# Patient Record
Sex: FEMALE | Race: WHITE | HISPANIC OR LATINO | Employment: FULL TIME | URBAN - METROPOLITAN AREA
[De-identification: names, ages, dates, MRNs, and addresses within clinical notes are randomized per-mention and may not be internally consistent; named-entity substitution may affect disease eponyms.]

---

## 2019-12-08 ENCOUNTER — APPOINTMENT (OUTPATIENT)
Dept: RADIOLOGY | Facility: MEDICAL CENTER | Age: 29
End: 2019-12-08
Attending: EMERGENCY MEDICINE
Payer: MEDICAID

## 2019-12-08 ENCOUNTER — HOSPITAL ENCOUNTER (EMERGENCY)
Facility: MEDICAL CENTER | Age: 29
End: 2019-12-08
Attending: EMERGENCY MEDICINE
Payer: MEDICAID

## 2019-12-08 VITALS
BODY MASS INDEX: 44.37 KG/M2 | OXYGEN SATURATION: 99 % | TEMPERATURE: 98.6 F | RESPIRATION RATE: 18 BRPM | HEART RATE: 92 BPM | DIASTOLIC BLOOD PRESSURE: 73 MMHG | WEIGHT: 205.03 LBS | SYSTOLIC BLOOD PRESSURE: 144 MMHG

## 2019-12-08 DIAGNOSIS — O20.0 ABORTION, THREATENED: ICD-10-CM

## 2019-12-08 LAB
B-HCG SERPL-ACNC: 8789 MIU/ML (ref 0–10)
BASOPHILS # BLD AUTO: 0.4 % (ref 0–1.8)
BASOPHILS # BLD: 0.06 K/UL (ref 0–0.12)
EOSINOPHIL # BLD AUTO: 0.15 K/UL (ref 0–0.51)
EOSINOPHIL NFR BLD: 1 % (ref 0–6.9)
ERYTHROCYTE [DISTWIDTH] IN BLOOD BY AUTOMATED COUNT: 43 FL (ref 35.9–50)
HCT VFR BLD AUTO: 43.6 % (ref 37–47)
HGB BLD-MCNC: 14.6 G/DL (ref 12–16)
IMM GRANULOCYTES # BLD AUTO: 0.11 K/UL (ref 0–0.11)
IMM GRANULOCYTES NFR BLD AUTO: 0.8 % (ref 0–0.9)
LYMPHOCYTES # BLD AUTO: 2.78 K/UL (ref 1–4.8)
LYMPHOCYTES NFR BLD: 19.3 % (ref 22–41)
MCH RBC QN AUTO: 30.4 PG (ref 27–33)
MCHC RBC AUTO-ENTMCNC: 33.5 G/DL (ref 33.6–35)
MCV RBC AUTO: 90.6 FL (ref 81.4–97.8)
MONOCYTES # BLD AUTO: 0.94 K/UL (ref 0–0.85)
MONOCYTES NFR BLD AUTO: 6.5 % (ref 0–13.4)
NEUTROPHILS # BLD AUTO: 10.38 K/UL (ref 2–7.15)
NEUTROPHILS NFR BLD: 72 % (ref 44–72)
NRBC # BLD AUTO: 0 K/UL
NRBC BLD-RTO: 0 /100 WBC
NUMBER OF RH DOSES IND 8505RD: NORMAL
PLATELET # BLD AUTO: 362 K/UL (ref 164–446)
PMV BLD AUTO: 9.4 FL (ref 9–12.9)
RBC # BLD AUTO: 4.81 M/UL (ref 4.2–5.4)
RH BLD: NORMAL
WBC # BLD AUTO: 14.4 K/UL (ref 4.8–10.8)

## 2019-12-08 PROCEDURE — 86901 BLOOD TYPING SEROLOGIC RH(D): CPT

## 2019-12-08 PROCEDURE — 84702 CHORIONIC GONADOTROPIN TEST: CPT

## 2019-12-08 PROCEDURE — 99284 EMERGENCY DEPT VISIT MOD MDM: CPT

## 2019-12-08 PROCEDURE — 76801 OB US < 14 WKS SINGLE FETUS: CPT

## 2019-12-08 PROCEDURE — 85025 COMPLETE CBC W/AUTO DIFF WBC: CPT

## 2019-12-09 NOTE — ED NOTES
UA collected and sent to the lab at this time. Pt changed into a gown and provided with warm blankets for comfort

## 2019-12-09 NOTE — DISCHARGE INSTRUCTIONS
"Pelvic rest  You have been given a diagnosis based on your short stay in the ED.  This may be a presumptive diagnosis and could be only the \"tip of the iceberg\" of a much more complicated problem.  Make sure that you follow up as directed or with a local primary care physician for a second evaluation.  If your condition worsens before follow up, return to the ED for evaluation.  Things like a new fever, worsening pain, change in symptoms, or just not getting better may be a sign of further problems.  If you have abdominal pain, make sure you return to the ED or follow up within 12-24 hours for a repeat evaluation if your pain continues.  This gives us a chance to take a second look at a process that may be changing and will now give us abnormal test results leading to a more definitive diagnosis.  Sometimes this will change your disposition and now require hospitalization or surgery.  Lastly, if you left the ED \"Against Medical Advice\", please know that you can return to the ED at any time for completion of your work up or admission to the hospital.     "

## 2019-12-09 NOTE — ED PROVIDER NOTES
ED Provider Note    ED Provider Note    Primary care provider: Pcp Pt States None    CHIEF COMPLAINT  Chief Complaint   Patient presents with   • Abdominal Pain   • Pregnancy     home preg test        HPI  Ann Salguero is a 29 y.o. female who presents with pelvic cramping and vag bleeding. Cramping started yesterday and bleeding earlier today.  Some clots.  Admits to nausea and dizziness.  Pt has irregular menses and states her last one was 2 months ago.  Positive home preg test two weeks ago.   with twins so this is her 4th pregnancy. No other complaints.    REVIEW OF SYSTEMS  ROS  All other systems negative. See HPI for further details.       ALLERGIES  Allergies   Allergen Reactions   • Pcn [Penicillins] Rash and Itching       CURRENT MEDICATIONS  Home Medications    **Home medications have not yet been reviewed for this encounter**         PAST MEDICAL HISTORY   has a past medical history of Allergic rhinitis and ASTHMA.    SURGICAL HISTORY   has a past surgical history that includes primary c section (2010).    SOCIAL HISTORY  Social History     Tobacco Use   • Smoking status: Never Smoker   • Smokeless tobacco: Never Used   Substance Use Topics   • Alcohol use: No   • Drug use: No      Social History     Substance and Sexual Activity   Drug Use No       FAMILY HISTORY  Family History   Problem Relation Age of Onset   • Lung Disease Mother         asthma   • Diabetes Mother    • Hypertension Mother    • Stroke Mother    • Heart Disease Mother         MI   • Hypertension Father    • Diabetes Paternal Grandfather          PHYSICAL EXAM  VITAL SIGNS: BP (!) 170/75   Pulse 92   Temp 37 °C (98.6 °F)   Resp 18   Wt 93 kg (205 lb 0.4 oz)   SpO2 99%   BMI 44.37 kg/m²   Constitutional: Well-nourished, Well developed. In mild distress.   Head: Normocephalic, Atraumatic  Eyes: PERRL, sclera anicteric, EOMI, no lid swelling  ENT: No nasal discharge or epistaxis. No facial deformity. Mucous membranes are  moist.   Cardiovascular: Regular rate and rhythm without S3,S4, or murmur.   Lungs: No respiratory distress. No cough. Lungs are clear bilaterally. No rales, rhonchi, or wheezing.   Abdomen: Soft, non-tender, non-distended. Without organomegaly. No rebound, rigidity, or guarding. No masses or abnormal pulsations.   Extremities: No deformity. Non-tender. No swelling.  Neurologic: Awake and alert. Normal speech. No sensory deficits. No motor deficits. Ambulatory with a steady gait.       DIAGNOSTIC STUDIES / PROCEDURES    Results for orders placed or performed during the hospital encounter of 12/08/19   CBC WITH DIFFERENTIAL   Result Value Ref Range    WBC 14.4 (H) 4.8 - 10.8 K/uL    RBC 4.81 4.20 - 5.40 M/uL    Hemoglobin 14.6 12.0 - 16.0 g/dL    Hematocrit 43.6 37.0 - 47.0 %    MCV 90.6 81.4 - 97.8 fL    MCH 30.4 27.0 - 33.0 pg    MCHC 33.5 (L) 33.6 - 35.0 g/dL    RDW 43.0 35.9 - 50.0 fL    Platelet Count 362 164 - 446 K/uL    MPV 9.4 9.0 - 12.9 fL    Neutrophils-Polys 72.00 44.00 - 72.00 %    Lymphocytes 19.30 (L) 22.00 - 41.00 %    Monocytes 6.50 0.00 - 13.40 %    Eosinophils 1.00 0.00 - 6.90 %    Basophils 0.40 0.00 - 1.80 %    Immature Granulocytes 0.80 0.00 - 0.90 %    Nucleated RBC 0.00 /100 WBC    Neutrophils (Absolute) 10.38 (H) 2.00 - 7.15 K/uL    Lymphs (Absolute) 2.78 1.00 - 4.80 K/uL    Monos (Absolute) 0.94 (H) 0.00 - 0.85 K/uL    Eos (Absolute) 0.15 0.00 - 0.51 K/uL    Baso (Absolute) 0.06 0.00 - 0.12 K/uL    Immature Granulocytes (abs) 0.11 0.00 - 0.11 K/uL    NRBC (Absolute) 0.00 K/uL   HCG QUANTITATIVE SERUM   Result Value Ref Range    Bhcg 8789.0 (H) 0.0 - 10.0 mIU/mL   RH TYPE FOR RHOGAM FROM E.D.   Result Value Ref Range    Emergency Department Rh Typing POS     Number Of Rh Doses Indicated ZERO        All labs reviewed by me.  Dr. Kyle reviewed and agreed with the EKG.     RADIOLOGY  US-OB 1ST TRIMESTER WITH TRANSVAGINAL (COMBO)    (Results Pending)     Films read by Radiologist, and  independently reviewed by myself.    COURSE & MEDICAL DECISION MAKING:  Nursing notes, VS, PMSFHx reviewed in chart.     Patient made stable throughout her emergency department course.  She has a quant of around 8000 and a ultrasound read by the tech as seeing an intrauterine placenta but no fetus at this time.  There is no free fluid or ectopic pregnancy noted.  The patient is stable to go home.  She is Rh+.  She is given a prescription for a repeat beta quant for Wednesday morning.  She will also be referred to the [facility] pregnancy center for follow-up.  She can expect the possibility of increased cramping and bleeding and she can return if worse or new symptoms arise.  She understood and agreed to the above plan.    FINAL IMPRESSION:  1. , threatened         DISPOSITION:  Home    Please note that this dictation was created using voice recognition software. I have worked with consultants from the vendor as well as technical experts from [facility] to optimize the interface. I have made every reasonable attempt to correct obvious errors, but I expect that there are errors of grammar and possibly content that I did not discover before finalizing the note.

## 2019-12-17 ENCOUNTER — GYNECOLOGY VISIT (OUTPATIENT)
Dept: OBGYN | Facility: CLINIC | Age: 29
End: 2019-12-17
Payer: MEDICAID

## 2019-12-17 DIAGNOSIS — O20.0 THREATENED ABORTION: ICD-10-CM

## 2019-12-17 DIAGNOSIS — A59.9 TRICHOMONAS INFECTION: ICD-10-CM

## 2019-12-17 PROCEDURE — 99203 OFFICE O/P NEW LOW 30 MIN: CPT | Mod: 25 | Performed by: OBSTETRICS & GYNECOLOGY

## 2019-12-17 PROCEDURE — 76830 TRANSVAGINAL US NON-OB: CPT | Performed by: OBSTETRICS & GYNECOLOGY

## 2019-12-17 NOTE — NON-PROVIDER
Pt here for Er f/u  C/o bleeding on 12/08/2019, but none since then.  Pt states that she has been sexually active with FOB who was not treated for trichomonas.  Requesting records from [facility] from [facility] pap smear.  Phone # [phone]  Pharmacy verified.

## 2019-12-18 NOTE — PROGRESS NOTES
Subjective:      Ann Salguero is a 29 y.o. female who presents as New Patient (ER f/u)            HPI patient is a 29-year-old  4 para 3-0-0-4 who presents today for follow-up from the emergency room for vaginal bleeding and positive pregnancy test.  She was seen in the ER for cramping and spotting on 2019 at which time she had an ultrasound showing an intrauterine gestational sac with yolk sac and no fetal pole.  She had beta-hCG levels drawn and was told to repeat labs in 48 hours and patient states she went to [facility] and had labs drawn but results are not available.  She reports she has not had any bleeding or spotting since her ER visit.  Patient states she was diagnosed with trichomonas from [facility] recently and she took medication but had unprotected intercourse right afterwards with FOB and that FOB had refused to get treated at that time.  I discussed with patient that she and her the FOB needs to go to the health department to be treated at the same time and patient agrees.  I discussed risks of untreated trichomonas infection in pregnancy.  Patient reports normal bowel and bladder functions.  Denies any pelvic pain.  She denies any nausea vomiting or dysuria.  Pregnancy is desired.  Patient is not using any contraception.  She is taking prenatal vitamins    ROS all organ systems were reviewed and were negative except for complaints in HPI       Objective:     There were no vitals taken for this visit.     Physical Exam  Vitals signs and nursing note reviewed. Exam conducted with a chaperone present.   Constitutional:       General: She is not in acute distress.     Appearance: Normal appearance. She is obese. She is not toxic-appearing.   Neck:      Musculoskeletal: Neck supple. No neck rigidity.   Cardiovascular:      Rate and Rhythm: Normal rate and regular rhythm.      Pulses: Normal pulses.      Heart sounds: Normal heart sounds.   Pulmonary:      Effort: Pulmonary effort is  normal. No respiratory distress.      Breath sounds: Normal breath sounds. No wheezing.   Abdominal:      General: Abdomen is flat. Bowel sounds are normal. There is no distension.      Palpations: Abdomen is soft.      Tenderness: There is no tenderness.   Genitourinary:     General: Normal vulva.      Labia:         Right: No rash, tenderness, lesion or injury.         Left: No rash, tenderness, lesion or injury.       Vagina: Vaginal discharge present. No bleeding.      Cervix: No friability, erythema, cervical bleeding or eversion.      Uterus: Enlarged. Not tender.       Adnexa:         Right: No mass, tenderness or fullness.          Left: No mass, tenderness or fullness.        Rectum: No external hemorrhoid.   Musculoskeletal: Normal range of motion.         General: No swelling or tenderness.   Lymphadenopathy:      Cervical: No cervical adenopathy.   Skin:     General: Skin is warm and dry.   Neurological:      General: No focal deficit present.      Mental Status: She is alert and oriented to person, place, and time.      Gait: Gait normal.   Psychiatric:         Mood and Affect: Mood normal.         Behavior: Behavior normal.         Thought Content: Thought content normal.         Judgment: Judgment normal.            Transvaginal ultrasound was performed and read by me:    Jones intrauterine pregnancy noted  Fetal heart rate was 128 bpm  Crown-rump length was 0.57 cm corresponding to 6 weeks and 2 days gestation  EDC by CRL is 2020  No adnexal masses noted  No pelvic free fluid noted    Impression: Normal intrauterine pregnancy at 6 weeks and 2 days gestation with EDC of 2020.     Assessment/Plan:       1. Threatened   Patient presents today for follow-up of threatened  from the ER.  Normal intrauterine pregnancy confirmed today at 6 weeks and 3 days gestation.  Findings discussed with patient  Pregnancy precautions and restrictions discussed  Patient to continue prenatal  vitamins  Patient to increase p.o. fluids    2. Trichomonas infection  I discussed trichomonas infection and risks.  I encourage patient to go with FOB to health department to be treated at the same time and patient agrees    3.  Precautions and plan of care were discussed.  Patient to follow-up in 3 to 4 weeks for new OB exam

## 2020-01-17 ENCOUNTER — INITIAL PRENATAL (OUTPATIENT)
Dept: OBGYN | Facility: CLINIC | Age: 30
End: 2020-01-17
Payer: MEDICAID

## 2020-01-17 VITALS — WEIGHT: 200 LBS | DIASTOLIC BLOOD PRESSURE: 74 MMHG | SYSTOLIC BLOOD PRESSURE: 124 MMHG | BODY MASS INDEX: 43.28 KG/M2

## 2020-01-17 DIAGNOSIS — O09.91 ENCOUNTER FOR SUPERVISION OF HIGH RISK PREGNANCY IN FIRST TRIMESTER, ANTEPARTUM: Primary | ICD-10-CM

## 2020-01-17 DIAGNOSIS — Z98.891 HISTORY OF CESAREAN SECTION: ICD-10-CM

## 2020-01-17 DIAGNOSIS — Z34.90 PREGNANCY, UNSPECIFIED GESTATIONAL AGE: ICD-10-CM

## 2020-01-17 LAB
APPEARANCE UR: NORMAL
BILIRUB UR STRIP-MCNC: NORMAL MG/DL
COLOR UR AUTO: NORMAL
GLUCOSE UR STRIP.AUTO-MCNC: NEGATIVE MG/DL
KETONES UR STRIP.AUTO-MCNC: 80 MG/DL
LEUKOCYTE ESTERASE UR QL STRIP.AUTO: NEGATIVE
NITRITE UR QL STRIP.AUTO: NEGATIVE
PH UR STRIP.AUTO: 5.5 [PH] (ref 5–8)
PROT UR QL STRIP: 100 MG/DL
RBC UR QL AUTO: NEGATIVE
SP GR UR STRIP.AUTO: >=1.03
UROBILINOGEN UR STRIP-MCNC: NORMAL MG/DL

## 2020-01-17 PROCEDURE — 81002 URINALYSIS NONAUTO W/O SCOPE: CPT | Performed by: NURSE PRACTITIONER

## 2020-01-17 PROCEDURE — 0500F INITIAL PRENATAL CARE VISIT: CPT | Performed by: NURSE PRACTITIONER

## 2020-01-17 RX ORDER — METRONIDAZOLE 500 MG/1
2000 TABLET ORAL ONCE
Qty: 4 TAB | Refills: 0 | Status: SHIPPED | OUTPATIENT
Start: 2020-01-17 | End: 2020-01-17

## 2020-01-17 ASSESSMENT — ENCOUNTER SYMPTOMS
NEUROLOGICAL NEGATIVE: 1
RESPIRATORY NEGATIVE: 1
PSYCHIATRIC NEGATIVE: 1
GASTROINTESTINAL NEGATIVE: 1
CARDIOVASCULAR NEGATIVE: 1
CONSTITUTIONAL NEGATIVE: 1
EYES NEGATIVE: 1
MUSCULOSKELETAL NEGATIVE: 1

## 2020-01-17 NOTE — LETTER
Cystic Fibrosis Carrier Testing  Ann Salguero    The following information is about a blood test that can be done to determine if you and/or your partner carry the gene for cystic fibrosis.    WHAT IS CYSTIC FIBROSIS?  · Cystic fibrosis (CF) is an inherited disease that affects more than 25,000 American children and young adults.  · Symptoms of CF vary but include lung congestion, pneumonia, diarrhea and poor growth.  Most people with CF have severe medical problems and some die at a young age.  Others have so few symptoms they are unaware they have CF.  · CF does not affect intelligence.  · Although there is no cure for CF at this time, scientists are making progress in improving treatment and in searching for a cure.  In the past many people with CF  at a very young age.  Today, many are living into their 20’s and 30’s.    IS THERE A CHANCE MY BABY COULD HAVE CYSTIC FIBROSIS?  · You can have a child with CF even if there is no history in your family (see chart below).  · CF testing can help determine if you are a carrier and at risk to have a child with CF.  Note: if both parents are carriers, there is a 1 in 4 (25%) chance with each pregnancy that they will have a child with CF.  · Carriers have one normal CF gene and one altered CF gene.  · People with CF have two altered CF genes.  · Most people have two normal copies of the CF gene.    Approximate risk that a couple with no family history of cystic fibrosis will have a child with cystic fibrosis:    Ethnic background / Risk     couple:  1 in 2,500   couple:  1 in 15,000            couple:  1 in 8,000     American couple:  1 in 32,000     WHAT TESTING IS AVAILABLE?  · There is a blood test that can be done to find out if you or your partner is a carrier.  · It is important to understand that CF carrier testing does not detect all CF carriers.  · If the test shows that you are both CF carriers, you unborn baby can be  tested to find out if the baby has CF.    HOW MUCH DOES IT COST TO HAVE CYSTIC FIBROSIS CARRIER TESTING?  · Cost and insurance coverage for CF carrier testing vary depending upon the laboratory used and your insurance policy.  · The average cost for CF carrier testing is $300 per person.  · Your genetic counselor can provide you with more information about cystic fibrosis carrier testing.    _____  Yes, I am interested in discussing carrier testing with a genetic counselor.    _____  No, I am not interested in CF carrier testing or in receiving more information about CF carrier testing.      Client signature: ________________________________________  1/17/2020

## 2020-01-17 NOTE — PROGRESS NOTES
Pt here for today for NOB visit   Pt states just minor nausea but no vomiting   Pt denies VB,LOF, and contractions   Good# [phone]  Pharmacy verified.   First prenatal care  Pt desires AFP  Planned pregnancy   FOB is involved and supportive   Pt works outside the home; maintenance work   Pt wears mask around cleaning products   Pt denies heavy lifting

## 2020-01-18 NOTE — PROGRESS NOTES
Subjective:      S:  Ann Salguero is a 29 y.o.  female  @ EGA: 11w4d MERA: Estimated Date of Delivery: 8/3/20  per US who presents for her new OB exam. She has a hx of vag delivery x 2 in  and  and a P C/S for twins in . She desires a TOLAC. She has no complaints. She was seen in ED on 19 for spotting and cramping with a f/u visit at [facility] on . She was dx w trich and advised to proceed to [facility] to seek tx for herself and partner. She has not done this yet. Desires AFP.  Declines CF.  Reports no FM, VB, LOF, or cramping.  Denies dysuria, vaginal DC.  Pt is  and lives with FORAOUL (Mathew). Works as , lifting precautions discussed.   Pregnancy is planned.  Not appropriate for Centering Pregnancy.    O:    Vitals:    20 1550   BP: 124/74   Weight: 90.7 kg (200 lb)    See H&P Prenatal Physical.  Wet mount: not done, known trich infection present        FHTs: 160        Fundal ht: 11cm     A:   1.  IUP @ 11w4d MERA: Estimated Date of Delivery: 8/3/20 per US         2.  S=D        3.    Patient Active Problem List    Diagnosis Date Noted   • Encounter for supervision of high risk pregnancy in first trimester, antepartum 2020   • History of  section for twins in -desires TOLAC 2020   • Threatened  2019   • Trichomonas infection 2019   • Increased BMI 2010   • ASTHMA          P:  1.  GC/CT done. Pap done.         2.  Prenatal labs ordered - lab slip given        3.  Discussed PNV, diet, and adequate water intake        4.  NOB packet given        5.  Return to office in 4 wks        6.  Complete OB US in 8-9 wks        7.  Rx for metronidazole        8.  Plan for f/u [facility] at next visit        9.  Plan for expedited partner treatment: Rx for metronidazole called into pharmacy for Mathew Rodriguez  1994    HPI    Review of Systems   Constitutional: Negative.    HENT: Negative.    Eyes: Negative.     Respiratory: Negative.    Cardiovascular: Negative.    Gastrointestinal: Negative.    Genitourinary: Negative.    Musculoskeletal: Negative.    Skin: Negative.    Neurological: Negative.    Endo/Heme/Allergies: Negative.    Psychiatric/Behavioral: Negative.           Objective:     /74   Wt 90.7 kg (200 lb)   BMI 43.28 kg/m²      Physical Exam  Vitals signs and nursing note reviewed.   Constitutional:       Appearance: She is well-developed.   Neck:      Musculoskeletal: Normal range of motion and neck supple.   Cardiovascular:      Rate and Rhythm: Normal rate and regular rhythm.      Heart sounds: Normal heart sounds.   Pulmonary:      Effort: Pulmonary effort is normal.      Breath sounds: Normal breath sounds.   Abdominal:      Palpations: Abdomen is soft.   Genitourinary:     Vagina: Normal.      Comments: Uterus enlarged, c/w 11 wk ga  Musculoskeletal: Normal range of motion.   Skin:     General: Skin is warm and dry.   Neurological:      Mental Status: She is alert and oriented to person, place, and time.      Deep Tendon Reflexes: Reflexes are normal and symmetric.   Psychiatric:         Behavior: Behavior normal.         Thought Content: Thought content normal.         Judgment: Judgment normal.                 Assessment/Plan:       1. Encounter for supervision of high risk pregnancy in first trimester, antepartum    - PRENATAL PANEL 3+HIV+HCV; Future  - URINE DRUG SCREEN W/CONF (AR); Future  - US-OB 2ND 3RD TRI COMPLETE; Future  - THINPREP PAP W/HPV AND CTNG; Future    2. Pregnancy, unspecified gestational age    - POCT Urinalysis    3. History of  section for twins in -desires TOLAC

## 2020-01-22 DIAGNOSIS — O09.91 ENCOUNTER FOR SUPERVISION OF HIGH RISK PREGNANCY IN FIRST TRIMESTER, ANTEPARTUM: ICD-10-CM

## 2020-01-22 DIAGNOSIS — O09.92 SUPERVISION OF HIGH RISK PREGNANCY IN SECOND TRIMESTER: ICD-10-CM

## 2020-02-11 ENCOUNTER — APPOINTMENT (OUTPATIENT)
Dept: RADIOLOGY | Facility: MEDICAL CENTER | Age: 30
End: 2020-02-11
Attending: EMERGENCY MEDICINE
Payer: MEDICAID

## 2020-02-11 ENCOUNTER — HOSPITAL ENCOUNTER (EMERGENCY)
Facility: MEDICAL CENTER | Age: 30
End: 2020-02-11
Attending: EMERGENCY MEDICINE
Payer: MEDICAID

## 2020-02-11 VITALS
RESPIRATION RATE: 16 BRPM | TEMPERATURE: 97.5 F | SYSTOLIC BLOOD PRESSURE: 120 MMHG | HEIGHT: 57 IN | OXYGEN SATURATION: 99 % | DIASTOLIC BLOOD PRESSURE: 84 MMHG | HEART RATE: 72 BPM | WEIGHT: 196.87 LBS | BODY MASS INDEX: 42.47 KG/M2

## 2020-02-11 DIAGNOSIS — J20.9 ACUTE BRONCHITIS, UNSPECIFIED ORGANISM: ICD-10-CM

## 2020-02-11 DIAGNOSIS — O20.0 THREATENED MISCARRIAGE: ICD-10-CM

## 2020-02-11 LAB
ALBUMIN SERPL BCP-MCNC: 3.9 G/DL (ref 3.2–4.9)
ALBUMIN/GLOB SERPL: 1.2 G/DL
ALP SERPL-CCNC: 52 U/L (ref 30–99)
ALT SERPL-CCNC: 36 U/L (ref 2–50)
ANION GAP SERPL CALC-SCNC: 10 MMOL/L (ref 0–11.9)
APPEARANCE UR: CLEAR
AST SERPL-CCNC: 24 U/L (ref 12–45)
B-HCG SERPL-ACNC: ABNORMAL MIU/ML (ref 0–5)
BASOPHILS # BLD AUTO: 0.4 % (ref 0–1.8)
BASOPHILS # BLD: 0.05 K/UL (ref 0–0.12)
BILIRUB SERPL-MCNC: 0.3 MG/DL (ref 0.1–1.5)
BILIRUB UR QL STRIP.AUTO: NEGATIVE
BUN SERPL-MCNC: 6 MG/DL (ref 8–22)
CALCIUM SERPL-MCNC: 9.5 MG/DL (ref 8.5–10.5)
CHLORIDE SERPL-SCNC: 102 MMOL/L (ref 96–112)
CO2 SERPL-SCNC: 22 MMOL/L (ref 20–33)
COLOR UR: YELLOW
CREAT SERPL-MCNC: 0.52 MG/DL (ref 0.5–1.4)
EOSINOPHIL # BLD AUTO: 0.1 K/UL (ref 0–0.51)
EOSINOPHIL NFR BLD: 0.9 % (ref 0–6.9)
ERYTHROCYTE [DISTWIDTH] IN BLOOD BY AUTOMATED COUNT: 42.5 FL (ref 35.9–50)
GLOBULIN SER CALC-MCNC: 3.3 G/DL (ref 1.9–3.5)
GLUCOSE SERPL-MCNC: 89 MG/DL (ref 65–99)
GLUCOSE UR STRIP.AUTO-MCNC: NEGATIVE MG/DL
HCT VFR BLD AUTO: 43.5 % (ref 37–47)
HGB BLD-MCNC: 14.5 G/DL (ref 12–16)
IMM GRANULOCYTES # BLD AUTO: 0.1 K/UL (ref 0–0.11)
IMM GRANULOCYTES NFR BLD AUTO: 0.9 % (ref 0–0.9)
KETONES UR STRIP.AUTO-MCNC: NEGATIVE MG/DL
LEUKOCYTE ESTERASE UR QL STRIP.AUTO: NEGATIVE
LYMPHOCYTES # BLD AUTO: 1.65 K/UL (ref 1–4.8)
LYMPHOCYTES NFR BLD: 14.3 % (ref 22–41)
MCH RBC QN AUTO: 30.1 PG (ref 27–33)
MCHC RBC AUTO-ENTMCNC: 33.3 G/DL (ref 33.6–35)
MCV RBC AUTO: 90.2 FL (ref 81.4–97.8)
MICRO URNS: NORMAL
MONOCYTES # BLD AUTO: 0.33 K/UL (ref 0–0.85)
MONOCYTES NFR BLD AUTO: 2.9 % (ref 0–13.4)
NEUTROPHILS # BLD AUTO: 9.32 K/UL (ref 2–7.15)
NEUTROPHILS NFR BLD: 80.6 % (ref 44–72)
NITRITE UR QL STRIP.AUTO: NEGATIVE
NRBC # BLD AUTO: 0 K/UL
NRBC BLD-RTO: 0 /100 WBC
NUMBER OF RH DOSES IND 8505RD: NORMAL
PH UR STRIP.AUTO: 6.5 [PH] (ref 5–8)
PLATELET # BLD AUTO: 354 K/UL (ref 164–446)
PMV BLD AUTO: 9.4 FL (ref 9–12.9)
POTASSIUM SERPL-SCNC: 3.6 MMOL/L (ref 3.6–5.5)
PROT SERPL-MCNC: 7.2 G/DL (ref 6–8.2)
PROT UR QL STRIP: NEGATIVE MG/DL
RBC # BLD AUTO: 4.82 M/UL (ref 4.2–5.4)
RBC UR QL AUTO: NEGATIVE
RH BLD: NORMAL
SODIUM SERPL-SCNC: 134 MMOL/L (ref 135–145)
SP GR UR STRIP.AUTO: 1
UROBILINOGEN UR STRIP.AUTO-MCNC: 0.2 MG/DL
WBC # BLD AUTO: 11.6 K/UL (ref 4.8–10.8)

## 2020-02-11 PROCEDURE — 84702 CHORIONIC GONADOTROPIN TEST: CPT

## 2020-02-11 PROCEDURE — 99284 EMERGENCY DEPT VISIT MOD MDM: CPT

## 2020-02-11 PROCEDURE — 81003 URINALYSIS AUTO W/O SCOPE: CPT

## 2020-02-11 PROCEDURE — 76815 OB US LIMITED FETUS(S): CPT

## 2020-02-11 PROCEDURE — 86901 BLOOD TYPING SEROLOGIC RH(D): CPT

## 2020-02-11 PROCEDURE — 85025 COMPLETE CBC W/AUTO DIFF WBC: CPT

## 2020-02-11 PROCEDURE — 80053 COMPREHEN METABOLIC PANEL: CPT

## 2020-02-11 NOTE — ED NOTES
Discharged to home. Verbalized understanding of all discharge instructions, education, and follow-up care. Denied concerns, needs upon discharge. Ambulated out of facility independently without difficulty.

## 2020-02-11 NOTE — ED TRIAGE NOTES
"Pt to triage, states \" she is about 16 weeks pregnant\" , c/o light\" spotting yesterday and today, with some cramping\"  Has an appointment on Friday at the pregnancy center, also congestion and cough. Pt provided urine cup in triage   "

## 2020-02-11 NOTE — ED PROVIDER NOTES
ED Provider Note    Scribed for Angel Figueredo M.D. by Brandon Encarnacion. 2/11/2020  2:14 PM    Primary care provider: Pcp Pt States None  Means of arrival: walk-in  History obtained from: Patient  History limited by: None    CHIEF COMPLAINT  Chief Complaint   Patient presents with   • Vaginal Bleeding   • Cramping   • Nasal Congestion       HPI  Ann Salguero is a 29 y.o. female who presents to the Emergency Department complaining of light spotting, mild left lower abdominal cramping, vomiting, fever, cough, and nausea that began yesterday and and has persisted since onset. She believes the spotting and abdominal cramping began active sexual intercourse yesterday. She denies diarrhea. [facility].    REVIEW OF SYSTEMS  Pertinent positives include v. Pertinent negatives include no diarrhea.  All other systems reviewed and negative.    PAST MEDICAL HISTORY   has a past medical history of Allergic rhinitis and ASTHMA.    SURGICAL HISTORY   has a past surgical history that includes primary c section (12/29/2010).    SOCIAL HISTORY  Social History     Tobacco Use   • Smoking status: Never Smoker   • Smokeless tobacco: Never Used   Substance Use Topics   • Alcohol use: No   • Drug use: No      Social History     Substance and Sexual Activity   Drug Use No       FAMILY HISTORY  Family History   Problem Relation Age of Onset   • Lung Disease Mother         asthma   • Diabetes Mother    • Hypertension Mother    • Stroke Mother    • Heart Disease Mother         MI   • Hypertension Father    • Diabetes Maternal Grandmother    • Hypertension Maternal Grandmother    • Hyperlipidemia Maternal Grandmother    • Diabetes Maternal Grandfather    • Hypertension Maternal Grandfather    • Hyperlipidemia Maternal Grandfather    • Diabetes Paternal Grandfather    • No Known Problems Sister        CURRENT MEDICATIONS  No current facility-administered medications on file prior to  "encounter.      Current Outpatient Medications on File Prior to Encounter   Medication Sig Dispense Refill   • albuterol (VENTOLIN OR PROVENTIL) 108 (90 BASE) MCG/ACT AERS Inhale 2 Puffs by mouth every 6 hours as needed for Shortness of Breath. 8.5 g 0   • albuterol (VENTOLIN OR PROVENTIL) 108 (90 BASE) MCG/ACT AERS Inhale 2 Puffs by mouth every 6 hours as needed.         ALLERGIES  Allergies   Allergen Reactions   • Pcn [Penicillins] Rash and Itching       PHYSICAL EXAM  VITAL SIGNS: /93   Pulse 86   Temp 36.4 °C (97.5 °F) (Temporal)   Resp 16   Ht 1.448 m (4' 9\")   Wt 89.3 kg (196 lb 13.9 oz)   SpO2 99%   BMI 42.60 kg/m²     Vital signs reviewed.  Constitutional:  No acute distress  Head: Normocephalic  Eyes: Pupils equal, round, and reactive  Mouth/Throat: Moist mucous membranes, clear oropharynx  Neck: Supple.  No JVD  Cardiovascular: Regular rate and rhythm  Pulmonary/Chest: Clear to auscultation bilaterally  Abdominal: Mild left lower quadrant abdominal pain without signs of peritonitis  Musculoskeletal: No CVA tenderness  Lymphadenopathy: No lymphadenopathy  Neurological: Normal strength and sensation.  Normal gait  Skin: Normal upper and lower extremities.  No rashes  Psychiatric: Awake alert oriented x3.  Normal judgment and insight                    LABS  Results for orders placed or performed during the hospital encounter of 02/11/20   CBC WITH DIFFERENTIAL   Result Value Ref Range    WBC 11.6 (H) 4.8 - 10.8 K/uL    RBC 4.82 4.20 - 5.40 M/uL    Hemoglobin 14.5 12.0 - 16.0 g/dL    Hematocrit 43.5 37.0 - 47.0 %    MCV 90.2 81.4 - 97.8 fL    MCH 30.1 27.0 - 33.0 pg    MCHC 33.3 (L) 33.6 - 35.0 g/dL    RDW 42.5 35.9 - 50.0 fL    Platelet Count 354 164 - 446 K/uL    MPV 9.4 9.0 - 12.9 fL    Neutrophils-Polys 80.60 (H) 44.00 - 72.00 %    Lymphocytes 14.30 (L) 22.00 - 41.00 %    Monocytes 2.90 0.00 - 13.40 %    Eosinophils 0.90 0.00 - 6.90 %    Basophils 0.40 0.00 - 1.80 %    Immature Granulocytes " 0.90 0.00 - 0.90 %    Nucleated RBC 0.00 /100 WBC    Neutrophils (Absolute) 9.32 (H) 2.00 - 7.15 K/uL    Lymphs (Absolute) 1.65 1.00 - 4.80 K/uL    Monos (Absolute) 0.33 0.00 - 0.85 K/uL    Eos (Absolute) 0.10 0.00 - 0.51 K/uL    Baso (Absolute) 0.05 0.00 - 0.12 K/uL    Immature Granulocytes (abs) 0.10 0.00 - 0.11 K/uL    NRBC (Absolute) 0.00 K/uL   COMP METABOLIC PANEL   Result Value Ref Range    Sodium 134 (L) 135 - 145 mmol/L    Potassium 3.6 3.6 - 5.5 mmol/L    Chloride 102 96 - 112 mmol/L    Co2 22 20 - 33 mmol/L    Anion Gap 10.0 0.0 - 11.9    Glucose 89 65 - 99 mg/dL    Bun 6 (L) 8 - 22 mg/dL    Creatinine 0.52 0.50 - 1.40 mg/dL    Calcium 9.5 8.5 - 10.5 mg/dL    AST(SGOT) 24 12 - 45 U/L    ALT(SGPT) 36 2 - 50 U/L    Alkaline Phosphatase 52 30 - 99 U/L    Total Bilirubin 0.3 0.1 - 1.5 mg/dL    Albumin 3.9 3.2 - 4.9 g/dL    Total Protein 7.2 6.0 - 8.2 g/dL    Globulin 3.3 1.9 - 3.5 g/dL    A-G Ratio 1.2 g/dL   RH TYPE FOR RHOGAM FROM E.D.   Result Value Ref Range    Emergency Department Rh Typing POS     Number Of Rh Doses Indicated ZERO    HCG QUANTITATIVE   Result Value Ref Range    Bhcg 68538.7 (H) 0.0 - 5.0 mIU/mL   URINALYSIS,CULTURE IF INDICATED   Result Value Ref Range    Color Yellow     Character Clear     Specific Gravity 1.004 <1.035    Ph 6.5 5.0 - 8.0    Glucose Negative Negative mg/dL    Ketones Negative Negative mg/dL    Protein Negative Negative mg/dL    Bilirubin Negative Negative    Urobilinogen, Urine 0.2 Negative    Nitrite Negative Negative    Leukocyte Esterase Negative Negative    Occult Blood Negative Negative    Micro Urine Req see below    ESTIMATED GFR   Result Value Ref Range    GFR If African American >60 >60 mL/min/1.73 m 2    GFR If Non African American >60 >60 mL/min/1.73 m 2         US-OB LIMITED WITH TRANSVAGINAL (COMBO)   Final Result      Single intrauterine pregnancy of an estimated gestational age of 15 weeks and 5 days  with an estimated date of delivery of 07/30/2020.       Trace amount of echogenic debris versus clot near the cervix.      Trace fluid in the cervical canal.      This examination does not constitute a complete fetal survey.          All labs reviewed by me.      RADIOLOGY  US-OB LIMITED WITH TRANSVAGINAL (COMBO)   Final Result      Single intrauterine pregnancy of an estimated gestational age of 15 weeks and 5 days  with an estimated date of delivery of 07/30/2020.      Trace amount of echogenic debris versus clot near the cervix.      Trace fluid in the cervical canal.      This examination does not constitute a complete fetal survey.        The radiologist's interpretation of all radiological studies have been reviewed by me.    COURSE & MEDICAL DECISION MAKING  Pertinent Labs & Imaging studies reviewed. (See chart for details) The patient's [facility] and past medical records were reviewed    2:14 PM - Patient seen and examined at bedside. Patient will be treated with Tylenol as needed. Ordered blood urine ultrasound to evaluate her symptoms.      She had no concerning results on lab work from today and her US demonstrated a single intrauterine pregnancy of an estimated gestational age of 15 weeks and 5 days. Her spotting and cramping are likely related to irritation of the cervix secondary to sexual intercourse. I recommended pelvic rest until symptoms persist. The patient presents today with signs and symptoms consistent with a viral upper respiratory infection.  They have a normal pulse oximetry on room air and a normal pulmonary exam.  Therefore, I feel that the likelihood of pneumonia is low. There are no signs of peritonitis. There is no tenderness to make me concerned for more serious intra-abdominal pathology. This patient does not demonstrate any clinical evidence of pneumonia, meningitis, appendicitis, or other acute medical emergency.  Overall, the patient is very well appearing. I do not feel that this patient would benefit from antibiotics at this  time.  I have recommended Tylenol and Ibuprofen as needed for fever. She was instructed [facility] and return to the ED if her symptoms worsen or for severe vaginal bleeding. Patient understands and agrees.      Patient be placed on pelvic rest in terms of her vaginal spotting after intercourse last night.  Patient was discharged home in stable condition        The patient will return for new or worsening symptoms and is stable at the time of discharge.    The patient is referred to a primary physician for blood pressure management, diabetic screening, and for all other preventative health concerns.    DISPOSITION:  Patient will be discharged home in stable condition.    FOLLOW UP:  No follow-up provider specified.    OUTPATIENT MEDICATIONS:  New Prescriptions    No medications on file       FINAL IMPRESSION  1. Threatened miscarriage    2. Acute bronchitis, unspecified organism          Brandon MILLER (Scribe), am scribing for, and in the presence of, Angel Figueredo M.D..    Electronically signed by: Brandon Encarnacion (Scribe), 2/11/2020    Angel MILLER M.D. personally performed the services described in this documentation, as scribed by Brandon Encarnacion in my presence, and it is both accurate and complete.    C    The note accurately reflects work and decisions made by me.  Angel Figueredo M.D.  2/11/2020  3:07 PM

## 2020-02-14 LAB
ABO GROUP BLD: NORMAL
BLD GP AB SCN SERPL QL: NEGATIVE
HCT VFR BLD AUTO: 43.7 %
HGB BLD-MCNC: 14.4 G/DL
HIV 1+2 AB+HIV1 P24 AG SERPL QL IA: NON REACTIVE
Lab: NEGATIVE
PLATELET # BLD AUTO: 357 10*3/UL
RH BLD: POSITIVE
RUBV IGG SERPL IA-ACNC: NORMAL
TREPONEMA PALLIDUM IGG+IGM AB [PRESENCE] IN SERUM OR PLASMA BY IMMUNOASSAY: NON REACTIVE

## 2020-02-19 ENCOUNTER — ROUTINE PRENATAL (OUTPATIENT)
Dept: OBGYN | Facility: CLINIC | Age: 30
End: 2020-02-19
Payer: MEDICAID

## 2020-02-19 DIAGNOSIS — Z34.82 ENCOUNTER FOR SUPERVISION OF OTHER NORMAL PREGNANCY IN SECOND TRIMESTER: ICD-10-CM

## 2020-02-19 PROCEDURE — 90040 PR PRENATAL FOLLOW UP: CPT | Performed by: ADVANCED PRACTICE MIDWIFE

## 2020-02-19 NOTE — NON-PROVIDER
S)  Ann is a 30 y/o  at 16w2d gestation. Presents today for routine prenatal care. Has been seen in the emergency department on 20 for bleeding. Reports no issues at this time. Reports no fetal movement at this time. Denies any cramping/contractions, bleeding, or leaking of fluid today. Denies headache or N/V. Generally feels well today.    O)  Vitals WNL  See prenatal flowsheet    A)  IUP 16w2d  S=D    P)  -S/S pre-term labor v general discomforts reviewed.   -Fetal movements reviewed  -Adequate hydration reinforced  -Nutrition/exercise/vitamin education; continue PNV  -Anticipatory guidance given  -U/S from ED reviewed with patient.   -Anatomy U/S in 4 weeks  -RTC in 4 weeks for routine prenatal care.     JUAN PABLO Giles, SNP

## 2020-02-19 NOTE — PROGRESS NOTES
S)  Ann is a 30 y/o  at 16w2d gestation. Presents today for routine prenatal care. Has been seen in the emergency department on 20 for bleeding. Reports no issues at this time. Reports no fetal movement at this time. Denies any cramping/contractions, bleeding, or leaking of fluid today. Denies headache or N/V. Generally feels well today. Declines AFP    O)  Vitals WNL  See prenatal flowsheet    A)  IUP 16w2d  S=D    P)  -S/S pre-term labor v general discomforts reviewed.   -Fetal movements reviewed  -Adequate hydration reinforced  -Nutrition/exercise/vitamin education; continue PNV  -Anticipatory guidance given  -U/S from ED reviewed with patient.   -Anatomy U/S in 4 weeks  -RTC in 4 weeks for routine prenatal care.     JUAN PABLO Giles, SNP

## 2020-03-18 ENCOUNTER — ROUTINE PRENATAL (OUTPATIENT)
Dept: OBGYN | Facility: CLINIC | Age: 30
End: 2020-03-18
Payer: MEDICAID

## 2020-03-18 VITALS — BODY MASS INDEX: 43.71 KG/M2 | SYSTOLIC BLOOD PRESSURE: 102 MMHG | WEIGHT: 202 LBS | DIASTOLIC BLOOD PRESSURE: 52 MMHG

## 2020-03-18 DIAGNOSIS — O09.92 SUPERVISION OF HIGH RISK PREGNANCY IN SECOND TRIMESTER: Primary | ICD-10-CM

## 2020-03-18 PROBLEM — O20.0 THREATENED ABORTION: Status: RESOLVED | Noted: 2019-12-17 | Resolved: 2020-03-18

## 2020-03-18 PROBLEM — O09.91 ENCOUNTER FOR SUPERVISION OF HIGH RISK PREGNANCY IN FIRST TRIMESTER, ANTEPARTUM: Status: RESOLVED | Noted: 2020-01-17 | Resolved: 2020-03-18

## 2020-03-18 LAB
APPEARANCE UR: CLEAR
BILIRUB UR STRIP-MCNC: NORMAL MG/DL
COLOR UR AUTO: NORMAL
GLUCOSE UR STRIP.AUTO-MCNC: NEGATIVE MG/DL
KETONES UR STRIP.AUTO-MCNC: NEGATIVE MG/DL
LEUKOCYTE ESTERASE UR QL STRIP.AUTO: NORMAL
NITRITE UR QL STRIP.AUTO: NEGATIVE
PH UR STRIP.AUTO: 7.5 [PH] (ref 5–8)
PROT UR QL STRIP: NORMAL MG/DL
RBC UR QL AUTO: NORMAL
SP GR UR STRIP.AUTO: 1.02
UROBILINOGEN UR STRIP-MCNC: NORMAL MG/DL

## 2020-03-18 PROCEDURE — 90040 PR PRENATAL FOLLOW UP: CPT | Performed by: NURSE PRACTITIONER

## 2020-03-18 PROCEDURE — 81002 URINALYSIS NONAUTO W/O SCOPE: CPT | Performed by: NURSE PRACTITIONER

## 2020-03-18 ASSESSMENT — FIBROSIS 4 INDEX: FIB4 SCORE: 0.35

## 2020-03-18 NOTE — LETTER
March 18, 2020              Ann Salguero is currently being cared for at [facility].  Her hours/activities need to be modified.  She should not lift over 20 pounds repetitively.          Thank you,          MESSI Dewey.    Electronically Signed

## 2020-03-18 NOTE — NON-PROVIDER
OB follow up   + fetal movement.  No VB, LOF or UC's.  Wt:202       BP: 102/52  Phone # [phone]  Preferred pharmacy confirmed.  Us scheduled for 03/30/2020  AFP ordered today  C/o having bleeding last night, and some tooth pain.

## 2020-03-18 NOTE — LETTER
2020      Ann Salguero is currently pregnant and being cared for by [facility].     She is medically cleared for:    1. Dental exams and routine cleaning  2. Tooth fillings and extractions as needed  3. Antibiotic therapy as appropriate  4. Local anesthesia    Patient may be administered the followin% Lidocaine with 1:100,000 Epinephrine  4% Septocaine with 1:100,000 Epinephrine  Nitrous Oxide  A narcotic or non-narcotic pain medication  An antibiotic such as penicillin or clindamycin    NO TETRACYCLINE and NO CODEINE and NO IBUPROFEN        Thank you,          MESSI Dewey.    Electronically Signed

## 2020-03-18 NOTE — PROGRESS NOTES
S) Pt is a 29 y.o.   at 20w2d  gestation. Routine prenatal care today. Had some spotting when she wiped with toilet paper last night, none further.  She denies intercourse, vaginal symptoms or UTI symptoms.    Fetal movement Normal  Cramping no  VB no  LOF no   Denies dysuria. Generally feels well today. Good self-care activities identified. Denies headaches, swelling, visual changes, or epigastric pain .     O) There were no vitals taken for this visit.        Labs:       PNL: rubella non immune, however, no hep b or c done       GCT:       AFP: Not Examined       GBS: N/A       Pertinent ultrasound - missed  today       Urine dip: large leuks, negative nitrites    A) IUP at 20w2d       S=D         Patient Active Problem List    Diagnosis Date Noted   • Encounter for supervision of high risk pregnancy in first trimester, antepartum 2020   • History of  section for twins in -desires TOLAC 2020   • Threatened  2019   • Trichomonas infection 2019   • Increased BMI 2010   • ASTHMA           SVE: deferred         TDAP: no       FLU: yes        BTL: no       : no       C/S Consent: yes       IOL or C/S scheduled: no       LAST PAP: 2020 negative         P) s/s ptl vs general discomforts. Fetal movements reviewed. General ed and anticipatory guidance. Nutrition/exercise/vitamin. Plans breast Plans pp contraception- unsure  Continue PNV.   Discussed bleeding precautions.  Has US rebooked for end of March.   RTC 4 weeks or PRN.

## 2020-03-30 ENCOUNTER — APPOINTMENT (OUTPATIENT)
Dept: RADIOLOGY | Facility: IMAGING CENTER | Age: 30
End: 2020-03-30
Attending: NURSE PRACTITIONER
Payer: MEDICAID

## 2020-03-30 DIAGNOSIS — O09.91 ENCOUNTER FOR SUPERVISION OF HIGH RISK PREGNANCY IN FIRST TRIMESTER, ANTEPARTUM: ICD-10-CM

## 2020-03-30 PROCEDURE — 76805 OB US >/= 14 WKS SNGL FETUS: CPT | Mod: TC | Performed by: OBSTETRICS & GYNECOLOGY

## 2020-04-23 ENCOUNTER — ROUTINE PRENATAL (OUTPATIENT)
Dept: OBGYN | Facility: CLINIC | Age: 30
End: 2020-04-23
Payer: MEDICAID

## 2020-04-23 VITALS — WEIGHT: 197.4 LBS | BODY MASS INDEX: 42.72 KG/M2 | DIASTOLIC BLOOD PRESSURE: 58 MMHG | SYSTOLIC BLOOD PRESSURE: 110 MMHG

## 2020-04-23 DIAGNOSIS — Z3A.25 25 WEEKS GESTATION OF PREGNANCY: ICD-10-CM

## 2020-04-23 PROCEDURE — 90040 PR PRENATAL FOLLOW UP: CPT | Performed by: NURSE PRACTITIONER

## 2020-04-23 ASSESSMENT — FIBROSIS 4 INDEX: FIB4 SCORE: 0.35

## 2020-04-23 NOTE — PROGRESS NOTES
OB follow up   + fetal movement. Barely feels baby  No VB, LOF or UC's.  Wt: 197.4LBS      BP:110/58  Phone #  [phone]  Preferred pharmacy confirmed.  C/o heavy bleeding on Tuesday has stopped bad cramping since  Ankles hurt and swollen

## 2020-04-23 NOTE — PROGRESS NOTES
SUBJECTIVE:  Pt is a 29 y.o.   at 25w3d  gestation. Presents today for follow-up prenatal care. Reports no issues at this time.  Reports good fetal movement. Denies regular cramping/contractions, bleeding or leaking of fluid. Denies dysuria, headaches, N/V. Generally feels well today except had episode of bleeding on Tuesday where she whiped three separate times and had light red bleeding on the toilet paper only. Only light pink since then.  Denies any recent sex. Reports works at [employer] where she lifts and pushes more than 20 pounds but is worried about giving them work letter she already has due to them maybe letting her go and she being only income for household. Reports swelling in ankles and lower abdominal cramping. Wear a support belt at work and reports stays well hydrated.     OBJECTIVE:  - See prenatal vitals flow  -   Vitals:    20 1035   BP: 110/58   Weight: 89.5 kg (197 lb 6.4 oz)                 ASSESSMENT:   - IUP at 25w3d    - S=D  Patient Active Problem List    Diagnosis Date Noted   • History of  section for twins in -desires TOLAC 2020   • ASTHMA          PLAN:  - S/sx pregnancy and labor warning signs vs general discomforts discussed  - Fetal movements and/or kick counts reviewed   - Adequate hydration reinforced  - Nutrition/exercise/vitamin education; continue PNV  - GCT ordered   - Reviewed contraindication to lifting/pushing more than 20 pounds and to let us know if she needs any other work letters for this although reports she already has one; she does not think work will be understanding of this and will let us know if she needs to be put on disability   - TOLAC consent signed   - Reviewed need to call or come in with bleeding   - Anticipatory guidance given  - RTC in 4 weeks for follow-up prenatal care w/MD to discuss delivery route

## 2020-05-20 ENCOUNTER — ROUTINE PRENATAL (OUTPATIENT)
Dept: OBGYN | Facility: CLINIC | Age: 30
End: 2020-05-20
Payer: MEDICAID

## 2020-05-20 VITALS — BODY MASS INDEX: 43.71 KG/M2 | SYSTOLIC BLOOD PRESSURE: 118 MMHG | WEIGHT: 202 LBS | DIASTOLIC BLOOD PRESSURE: 70 MMHG

## 2020-05-20 DIAGNOSIS — Z3A.29 29 WEEKS GESTATION OF PREGNANCY: ICD-10-CM

## 2020-05-20 DIAGNOSIS — Z98.891 HISTORY OF CESAREAN SECTION: ICD-10-CM

## 2020-05-20 PROCEDURE — 90471 IMMUNIZATION ADMIN: CPT | Performed by: OBSTETRICS & GYNECOLOGY

## 2020-05-20 PROCEDURE — 90715 TDAP VACCINE 7 YRS/> IM: CPT | Performed by: OBSTETRICS & GYNECOLOGY

## 2020-05-20 PROCEDURE — 90040 PR PRENATAL FOLLOW UP: CPT | Performed by: OBSTETRICS & GYNECOLOGY

## 2020-05-20 ASSESSMENT — FIBROSIS 4 INDEX: FIB4 SCORE: 0.35

## 2020-05-20 NOTE — PROGRESS NOTES
Chief complaint: Return OB visit    S: Pt presents for routine OB follow up. Good fetal movement.  No contractions, vaginal bleeding, or leakage of fluid.    Questions answered.  Doing well, no complaints    O: /70   Wt 91.6 kg (202 lb)   BMI 43.71 kg/m²   Patients' weight gain, fluid intake and exercise level discussed.  Vitals, fundal height , fetal position, and FHR reviewed on flowsheet    Lab:No results found for this or any previous visit (from the past 336 hour(s)).    A/P:  29 y.o.  at 29w2d presents for routine obstetric follow-up.  Size equals dates and/or scan    1.  Continue prenatal vitamins.  2.  Fetal kick counts.  3.  Exercise at least 30 minutes daily.  4.  Drink at least 2L of water daily  5.  Labor precautions educated.  6.  Follow-up in 2 weeks.  7.  GBS at 36 weeks    History of  x1 secondary to twin pregnancy and non-vertex presentation.  Has had 2 prior vaginal deliveries.    Discussed with patient appropriate candidacy for trial of labor after  delivery. Patient does have a previous  ×1. Patient is appropriate candidate for trial of labor if so desires. After discussion of risks and benefits associated with vaginal birth after  including risk of uterine rupture being one in 100, also discussed with patient the possibility that if uterus ruptures may be impossible for us to deliver the baby without having fetal compromise. Also discussed the risks of repeat  delivery.  Patient would like trial of labor after  at this time

## 2020-05-20 NOTE — PROGRESS NOTES
OB follow up   + fetal movement.  No VB, LOF or UC's.  Phone # [phone]  Preferred pharmacy confirmed.  Kick count sheet given today.  3rd trimester labs not done yet, will do next week.  TDap today  BTL declined

## 2020-05-20 NOTE — NON-PROVIDER
TDap given to patient. L Deltoid. Screening checklist reviewed with patient. VIS given. Verified by ALEKSANDRA

## 2020-05-20 NOTE — LETTER
"Count Your Baby's Movements  Another step to a healthy delivery    Ann Salguero             Dept: [phone]    How Many Weeks Pregnant=29w2d    Date to Begin Countin20              How to use this chart    One way for your physician to keep track of your baby's health is by knowing how often the baby moves (or \"kicks\") in your womb.  You can help your physician to do this by using this chart every day.    Every day, you should see how many hours it takes for your baby to move 10 times.  Start in the morning, as soon as you get up.    · First, write down the time your baby moves until you get to 10.  · Check off one box every time your baby moves until you get to 10.  · Write down the time you finished counting in the last column.  · Total how long it took to count up all 10 movements.  · Finally, fill in the box that shows how long this took.  After counting 10 movements, you no longer have to count any more that day.  The next morning, just start counting again as soon as you get up.    What should you call a \"movement\"?  It is hard to say, because it will feel different from one mother to another and from one pregnancy to the next.  The important thing is that you count the movements the same way throughout your pregnancy.  If you have more questions, you should ask your physician.    Count carefully every day!  SAMPLE:  Week 28    How many hours did it take to feel 10 movements?       Start  Time     1     2     3     4     5     6     7     8     9     10   Finish Time   Mon 8:20 ·  ·  ·  ·  ·  ·  ·  ·  ·  ·  11:40                  Sat               Sun                 IMPORTANT: You should contact your physician if it takes more than two hours for you to feel 10 movements.  Each morning, write down the time and start to count the movements of your baby.  Keep track by checking off one box every time you feel one movement.  When you have felt 10 " "\"kicks\", write down the time you finished counting in the last column.  Then fill in the   box (over the check osvaldo) for the number of hours it took.  Be sure to read the complete instructions on the previous page.            "

## 2020-06-03 ENCOUNTER — ROUTINE PRENATAL (OUTPATIENT)
Dept: OBGYN | Facility: CLINIC | Age: 30
End: 2020-06-03
Payer: MEDICAID

## 2020-06-03 VITALS — BODY MASS INDEX: 42.85 KG/M2 | DIASTOLIC BLOOD PRESSURE: 62 MMHG | WEIGHT: 198 LBS | SYSTOLIC BLOOD PRESSURE: 108 MMHG

## 2020-06-03 DIAGNOSIS — Z98.891 HISTORY OF CESAREAN SECTION: ICD-10-CM

## 2020-06-03 PROCEDURE — 90040 PR PRENATAL FOLLOW UP: CPT | Performed by: NURSE PRACTITIONER

## 2020-06-03 RX ORDER — ALBUTEROL SULFATE 90 UG/1
2 AEROSOL, METERED RESPIRATORY (INHALATION) EVERY 6 HOURS PRN
Qty: 8.5 G | Refills: 2 | Status: ON HOLD | OUTPATIENT
Start: 2020-06-03 | End: 2020-07-31

## 2020-06-03 RX ORDER — MOMETASONE FUROATE AND FORMOTEROL FUMARATE DIHYDRATE 50; 5 UG/1; UG/1
1 AEROSOL RESPIRATORY (INHALATION) DAILY
COMMUNITY
Start: 2020-03-17 | End: 2021-03-09 | Stop reason: SDUPTHER

## 2020-06-03 ASSESSMENT — FIBROSIS 4 INDEX: FIB4 SCORE: 0.36

## 2020-06-03 NOTE — PROGRESS NOTES
SUBJECTIVE:  Pt is a 30 y.o.   at 31w2d  gestation. Presents today for follow-up prenatal care. Reports no issues at this time.  Reports good fetal movement. Denies regular cramping/contractions, bleeding or leaking of fluid. Denies dysuria, headaches, N/V. Generally feels well today. Reports asthma stable but needs refill on meds.      OBJECTIVE:  - See prenatal vitals flow  -   Vitals:    20 1101   BP: 108/62   Weight: 89.8 kg (198 lb)                 ASSESSMENT:   - IUP at 31w2d    - S=D   -   Patient Active Problem List    Diagnosis Date Noted   • History of  section for twins in -desires TOLAC 2020   • ASTHMA          PLAN:  - S/sx pregnancy and labor warning signs vs general discomforts discussed  - Fetal movements and/or kick counts reviewed   - Adequate hydration reinforced  - Nutrition/exercise/vitamin education; continue PNV  - Will do GCT tomorrow as she lost papers  - Refill on asthma medication   - S/p Tdap vacc   - Anticipatory guidance given  - RTC in 2 weeks for follow-up prenatal care

## 2020-06-03 NOTE — PROGRESS NOTES
Pt here today for OB follow up  Reports +FM  WT: 198 lb  BP: 108/62  Preferred pharmacy verified with pt.  3rd trimester labs not done yet. Pt states she will get blood work done tomorrow or Friday of this week. Lab slips reprinted for pt and given instructions.   [place] # [phone]

## 2020-06-07 ENCOUNTER — HOSPITAL ENCOUNTER (OUTPATIENT)
Facility: MEDICAL CENTER | Age: 30
End: 2020-06-07
Attending: OBSTETRICS & GYNECOLOGY | Admitting: OBSTETRICS & GYNECOLOGY
Payer: MEDICAID

## 2020-06-07 VITALS — HEART RATE: 76 BPM | SYSTOLIC BLOOD PRESSURE: 119 MMHG | DIASTOLIC BLOOD PRESSURE: 75 MMHG

## 2020-06-07 PROCEDURE — 59025 FETAL NON-STRESS TEST: CPT

## 2020-06-07 NOTE — PROGRESS NOTES
1455  Pt into triage c/o having fallen in the shower yesterday.  Pt reports that she slipped onto her L side.  Pt has no bruising or lacerations.  Pt reports that in the evening yesterday she felt some UC's.  Pt has no report of bleeding or leaking and reports positive fetal movement.  Monitors placed at this time.  Pt reports having some yellowish, maybe green discharge into the toilet and on her panty liner.  1510  Report to Resident and awaiting orders at this time.  1600  Dr. Quinn phoned and orders received at this time for the pt to follow up at [facility] for her discharge and that she may go home and return if she is having any signs of labor or new concerns.  Pt states understanding and changed into her regular clothing and to home via ambulation at 1625.

## 2020-06-07 NOTE — PROGRESS NOTES
[facility] LABOR AND DELIVERY TRIAGE PROGRESS NOTE    PATIENT ID:  NAME:  Ann Salguero  MRN:               [id number]  YOB: 1990     30 y.o. female  at 31w6d.    Subjective: Pt reports after a fall in the bathroom yesterday while showering. Notes she fell and landed on her left side and hit her lower back and buttocks. Since that time has had some lower abd cramping but no contractions. Reports decreased fetal movement since then. No history of HTN in pregnancy.  No history of PreE. No nausea or vomiting. No fevers. No LOF. No vaginal bleeding    Also reports yellow/green foul smelling discharge which started ~2 days ago. Reports she had an STD in early pregnancy and was treated but cannot remember what STD.    Pregnancy complicated by bleeding first trimester. Intermittent headaches throughout pregnancy and intermittent RUQ pain. Neither currently.     negative  For CTXS.   positive Feels pain   negative for LOF  negative for vaginal bleeding.   Decreased for fetal movement    ROS: Patient denies any fever chills, nausea, vomiting, headache, chest pain, shortness of breath, or dysuria or unusual swelling of hands or feet.      Objective:    Vitals:    20 1456 20 1511 20 1526   BP: 154/82 121/72 123/69   Pulse: 87 81 80     No data recorded.    General: No acute distress, resting comfortably in bed.  HEENT: normocephalic, nontraumatic, EOMI  Cardiovascular: Heart RRR with no murmurs, rubs or gallops.  Respiratory: symmetric chest expansion, lungs CTAB, with no wheezes, rales, rhonci  Abdomen: gravid, nontender   Musculoskeletal: strength 5/5 in four extremities  Neuro: non focal with no numbness, tingling or changes in sensation    Jones: Not camille  FHRM: Baseline 130, Accels to 150, no decels, moderate variability    Assessment: 30 y.o. female  at 31w6d. BP cycled every 20 minutes and have been stable. NST is reactive w/ moderate variability. Safe to be discharged home w/  outpatient follow up for her yellow/green discharge    Plan:   1. Follow up with [facility] outpatient for evaluation of green/yellow discharge   Patient is cleared to return home with family. Encouraged to see MD/DO for increased painful uterine contractions @ 3-5, vaginal bleeding, loss of fluid, or other serious symptoms.      Discussed case with Dr. Quinn, C Attending. Case was discussed and attending agreed with plan prior to discharge of patient.    Paulino Gomez D.O.

## 2020-06-07 NOTE — DISCHARGE INSTRUCTIONS
Practice comfort measures for your soreness, bath, tylenol or hot compresses.  Follow up with the [facility] this coming week regarding your discharge.  Drink plenty of water and return for any signs of labor.  Return for decreased fetal movement.

## 2020-06-17 ENCOUNTER — ROUTINE PRENATAL (OUTPATIENT)
Dept: OBGYN | Facility: CLINIC | Age: 30
End: 2020-06-17
Payer: MEDICAID

## 2020-06-17 VITALS — DIASTOLIC BLOOD PRESSURE: 66 MMHG | SYSTOLIC BLOOD PRESSURE: 108 MMHG | BODY MASS INDEX: 43.5 KG/M2 | WEIGHT: 201 LBS

## 2020-06-17 DIAGNOSIS — Z34.83 ENCOUNTER FOR SUPERVISION OF OTHER NORMAL PREGNANCY, THIRD TRIMESTER: ICD-10-CM

## 2020-06-17 PROCEDURE — 90040 PR PRENATAL FOLLOW UP: CPT | Performed by: ADVANCED PRACTICE MIDWIFE

## 2020-06-17 ASSESSMENT — FIBROSIS 4 INDEX: FIB4 SCORE: 0.36

## 2020-06-17 NOTE — PROGRESS NOTES
Has patient been referred to outside provider?   no    Records available on chart?   n/a    Had physician visit? yes  Indication:  TOLAC/    SUBJECTIVE:  Pt is a 30 y.o.   at 33w2d  gestation. Presents today for follow-up prenatal care. Has  been seen in ER or L & D since last visit. Reports good  fetal movement.     Leaking of fluid?       no    Dysuria?       no    Headaches?      no    N/V?          no    Cramping/contractions?      no    Sometimes pain in back on right side. She is complaining today of green discharge. Started a few days ago. She went to L & D after falling on tub. She was monitored and discharged. She was treated at initial OB for BV and is concerned about odor she has now.     OBJECTIVE:   /66   Wt 91.2 kg (201 lb)   BMI 43.50 kg/m²   Patients' weight gain, fluid intake and exercise level discussed.  Vitals, fundal height , fetal position, and FHR reviewed on flowsheet    Lab:No results found for this or any previous visit (from the past 336 hour(s)).    ASSESSMENT/ PLAN:   - IUP at 33w2d    - S > D   -   Patient Active Problem List    Diagnosis Date Noted   • History of  section for twins in -desires TOLAC 2020   • ASTHMA          - S/sx pregnancy and labor warning signs vs general discomforts discussed  - Fetal movements and kick counts reviewed. Adequate hydration reinforced.  - Did discuss current COVID policies related to outpatient and inpatient visits.   - Anticipatory guidance provided. Patient to see physician at next visit. Already had counseling for TOLAC.  Check in to ensure continued candidacy.   - Collected vag path and GC/CHL due to discharge. Will await results and treat.   - RTC in 2 weeks for routine prenatal care.

## 2020-06-17 NOTE — NON-PROVIDER
OB follow up   + fetal movement.  No VB, LOF or UC's.  Wt:201       BP:108/66  Phone # [phone]  Preferred pharmacy confirmed.  C/o 1 week ago patient went to the ER because she fell and her back was hurting, but she is doing better now. Green discharge with occasional itchiness with odor.

## 2020-06-19 DIAGNOSIS — R73.09 ABNORMAL GTT (GLUCOSE TOLERANCE TEST): Primary | ICD-10-CM

## 2020-06-19 DIAGNOSIS — Z34.83 ENCOUNTER FOR SUPERVISION OF OTHER NORMAL PREGNANCY, THIRD TRIMESTER: ICD-10-CM

## 2020-06-19 DIAGNOSIS — R73.09 ABNORMAL GTT (GLUCOSE TOLERANCE TEST): ICD-10-CM

## 2020-06-19 DIAGNOSIS — Z3A.25 25 WEEKS GESTATION OF PREGNANCY: ICD-10-CM

## 2020-06-29 ENCOUNTER — ROUTINE PRENATAL (OUTPATIENT)
Dept: OBGYN | Facility: CLINIC | Age: 30
End: 2020-06-29
Payer: MEDICAID

## 2020-06-29 VITALS — WEIGHT: 200.7 LBS | BODY MASS INDEX: 43.43 KG/M2 | DIASTOLIC BLOOD PRESSURE: 60 MMHG | SYSTOLIC BLOOD PRESSURE: 102 MMHG

## 2020-06-29 DIAGNOSIS — R73.09 ABNORMAL GTT (GLUCOSE TOLERANCE TEST): ICD-10-CM

## 2020-06-29 PROCEDURE — 90040 PR PRENATAL FOLLOW UP: CPT | Performed by: OBSTETRICS & GYNECOLOGY

## 2020-06-29 RX ORDER — METRONIDAZOLE 500 MG/1
2000 TABLET ORAL ONCE
Qty: 4 TAB | Refills: 0 | Status: SHIPPED | OUTPATIENT
Start: 2020-06-29 | End: 2020-06-29

## 2020-06-29 ASSESSMENT — FIBROSIS 4 INDEX: FIB4 SCORE: 0.36

## 2020-06-29 NOTE — PROGRESS NOTES
Pt. Here for OB/FU. Reports Good FM.   Good # [phone]  Pt. Denies VB, LOF, or UC's.   Pharmacy verified.   Chaperone offered and not indicated

## 2020-06-29 NOTE — PROGRESS NOTES
S: Pt presents for routine OB follow up.  No contractions, vaginal bleeding, or leaking fluids. Good fetal movement.    Questions answered.    O: /60   Wt 91 kg (200 lb 11.2 oz)   BMI 43.43 kg/m²   Patients' weight gain, fluid intake and exercise level discussed.  Vitals, fundal height , fetal position, and FHR reviewed on flowsheet    Lab:No results found for this or any previous visit (from the past 336 hour(s)).    A/P:  30 y.o.  at 35w0d presents for routine obstetric follow-up.  Size equals dates and/or scan  Desires TOLAC  Counseling done  Needs 3hr GTT, pt aware of importance and agrees to do the lab tomorrow morning, understands risks of diabetes in pregnancy   Hb a1c ordered as may be easier for patient to have done  Vag path were ordered, but never resulted - pt is having green discharge and had trich early in pregnancy  [facility]ed [facility] and the results were positive for trichomonas, pt is aware - FOB is not in life currently   2g of flagyl prescribed for pt encouraged partner treatment as well  1.  Continue prenatal vitamins.  2.  Begin kick counts at 28 weeks.  3.  Exercise at least 30 minutes daily.  4.  Drink at least 2 Liters of water daily  5.  Follow-up in 1 weeks.

## 2020-07-03 DIAGNOSIS — O24.419 GESTATIONAL DIABETES MELLITUS (GDM), ANTEPARTUM, GESTATIONAL DIABETES METHOD OF CONTROL UNSPECIFIED: Primary | ICD-10-CM

## 2020-07-05 ENCOUNTER — HOSPITAL ENCOUNTER (OUTPATIENT)
Facility: MEDICAL CENTER | Age: 30
End: 2020-07-05
Attending: OBSTETRICS & GYNECOLOGY | Admitting: OBSTETRICS & GYNECOLOGY
Payer: MEDICAID

## 2020-07-05 VITALS
BODY MASS INDEX: 39.27 KG/M2 | HEIGHT: 60 IN | SYSTOLIC BLOOD PRESSURE: 130 MMHG | DIASTOLIC BLOOD PRESSURE: 73 MMHG | TEMPERATURE: 97.7 F | WEIGHT: 200 LBS | RESPIRATION RATE: 18 BRPM | HEART RATE: 100 BPM | OXYGEN SATURATION: 96 %

## 2020-07-05 LAB
APPEARANCE UR: ABNORMAL
APPEARANCE UR: CLEAR
BACTERIA GENITAL QL WET PREP: NORMAL
COLOR UR AUTO: ABNORMAL
COLOR UR AUTO: YELLOW
GLUCOSE UR QL STRIP.AUTO: NEGATIVE MG/DL
GLUCOSE UR QL STRIP.AUTO: NEGATIVE MG/DL
KETONES UR QL STRIP.AUTO: ABNORMAL MG/DL
KETONES UR QL STRIP.AUTO: NEGATIVE MG/DL
LEUKOCYTE ESTERASE UR QL STRIP.AUTO: ABNORMAL
LEUKOCYTE ESTERASE UR QL STRIP.AUTO: ABNORMAL
NITRITE UR QL STRIP.AUTO: NEGATIVE
NITRITE UR QL STRIP.AUTO: NEGATIVE
PH UR STRIP.AUTO: 5.5 [PH] (ref 5–8)
PH UR STRIP.AUTO: 6 [PH] (ref 5–8)
PROT UR QL STRIP: 30 MG/DL
PROT UR QL STRIP: NEGATIVE MG/DL
RBC UR QL AUTO: ABNORMAL
RBC UR QL AUTO: ABNORMAL
SIGNIFICANT IND 70042: NORMAL
SITE SITE: NORMAL
SOURCE SOURCE: NORMAL
SP GR UR STRIP.AUTO: 1.01 (ref 1–1.03)
SP GR UR STRIP.AUTO: >=1.03 (ref 1–1.03)

## 2020-07-05 PROCEDURE — 81002 URINALYSIS NONAUTO W/O SCOPE: CPT | Mod: 91

## 2020-07-05 PROCEDURE — 59025 FETAL NON-STRESS TEST: CPT

## 2020-07-05 ASSESSMENT — FIBROSIS 4 INDEX: FIB4 SCORE: 0.36

## 2020-07-05 ASSESSMENT — PAIN SCALES - GENERAL: PAINLEVEL: 4

## 2020-07-07 ENCOUNTER — ROUTINE PRENATAL (OUTPATIENT)
Dept: OBGYN | Facility: CLINIC | Age: 30
End: 2020-07-07
Payer: MEDICAID

## 2020-07-07 VITALS — SYSTOLIC BLOOD PRESSURE: 110 MMHG | WEIGHT: 201 LBS | DIASTOLIC BLOOD PRESSURE: 68 MMHG | BODY MASS INDEX: 39.26 KG/M2

## 2020-07-07 DIAGNOSIS — O24.419 GESTATIONAL DIABETES MELLITUS (GDM) IN THIRD TRIMESTER, GESTATIONAL DIABETES METHOD OF CONTROL UNSPECIFIED: ICD-10-CM

## 2020-07-07 DIAGNOSIS — Z34.83 ENCOUNTER FOR SUPERVISION OF OTHER NORMAL PREGNANCY, THIRD TRIMESTER: ICD-10-CM

## 2020-07-07 PROBLEM — Z98.891 HISTORY OF CESAREAN SECTION: Status: RESOLVED | Noted: 2020-01-17 | Resolved: 2020-07-07

## 2020-07-07 PROBLEM — Z98.891 HISTORY OF CESAREAN SECTION: Status: ACTIVE | Noted: 2020-07-07

## 2020-07-07 PROCEDURE — 90040 PR PRENATAL FOLLOW UP: CPT | Performed by: ADVANCED PRACTICE MIDWIFE

## 2020-07-07 RX ORDER — LANCETS 30 GAUGE
EACH MISCELLANEOUS
Qty: 100 EACH | Refills: 0 | Status: SHIPPED | OUTPATIENT
Start: 2020-07-07 | End: 2021-07-26

## 2020-07-07 RX ORDER — METRONIDAZOLE 500 MG/1
500 TABLET ORAL 2 TIMES DAILY
Qty: 14 TAB | Refills: 0 | Status: ON HOLD | OUTPATIENT
Start: 2020-07-07 | End: 2020-07-28

## 2020-07-07 RX ORDER — GLUCOSAMINE HCL/CHONDROITIN SU 500-400 MG
CAPSULE ORAL
Qty: 100 EACH | Refills: 0 | Status: SHIPPED | OUTPATIENT
Start: 2020-07-07 | End: 2021-07-26

## 2020-07-07 ASSESSMENT — FIBROSIS 4 INDEX: FIB4 SCORE: 0.36

## 2020-07-07 NOTE — PROGRESS NOTES
Has patient been referred to outside provider?   none    Records available on chart?   n/a    Had physician visit? yes  Indication:  GDM, one is scheduled    SUBJECTIVE:  Pt is a 30 y.o.   at 36w1d  gestation. Presents today for follow-up prenatal care. Has  been seen in ER or L & D since last visit. Reports good  fetal movement.     Leaking of fluid?       no    Dysuria?       no    Headaches?      no    N/V?          no    Cramping/contractions?      Yes but mainly at night or when she sits for extended periods of time.     Had vaginal bleeding on  (2 days ago). Went to L & D and treated for dehydration. Checked for infection and all was normal. Was previously treated for trich.    Just got back glucose testing that was elevated and no additional appointments scheduled.     OBJECTIVE:   /68   Wt 91.2 kg (201 lb)   BMI 39.26 kg/m²   Patients' weight gain, fluid intake and exercise level discussed.  Vitals, fundal height , fetal position, and FHR reviewed on flowsheet    Lab:  Recent Results (from the past 336 hour(s))   POC UA    Collection Time: 20  6:59 PM   Result Value Ref Range    POC Color Nabila     POC Appearance Slightly Cloudy (A)     POC Glucose Negative Negative mg/dL    POC Ketones Trace (A) Negative mg/dL    POC Specific Gravity >=1.030 (A) 1.005 - 1.030    POC Blood Moderate (A) Negative    POC Urine PH 5.5 5.0 - 8.0    POC Protein 30 (A) Negative mg/dL    POC Nitrites Negative Negative    POC Leukocyte Esterase Small (A) Negative   POC UA    Collection Time: 20  8:27 PM   Result Value Ref Range    POC Color Yellow     POC Appearance Clear     POC Glucose Negative Negative mg/dL    POC Ketones Negative Negative mg/dL    POC Specific Gravity 1.015 1.005 - 1.030    POC Blood Large (A) Negative    POC Urine PH 6.0 5.0 - 8.0    POC Protein Negative Negative mg/dL    POC Nitrites Negative Negative    POC Leukocyte Esterase Large (A) Negative   WET PREP    Collection Time:  20  9:00 PM    Specimen: Vaginal; Genital   Result Value Ref Range    Significant Indicator NEG     Source GEN     Site VAGINAL     Wet Prep For Parasites       Few WBCs seen.  Few clue cells seen.  No yeast.  No motile Trichomonas seen.         ASSESSMENT/ PLAN:   - IUP at 36w1d    - S = D   -   Patient Active Problem List    Diagnosis Date Noted   • History of  section 2020   • Gestational diabetes mellitus (GDM), antepartum 2020   • Abnormal GTT (glucose tolerance test) 2020   • ASTHMA          GDM  Discussed in detail with patient the current diagnosis. Briefly reviewed diet with patient. Encouraged decrease in juice and soda consumption. She is  Scheduled for GDM class tomorrow with growth US due to late diagnosis. Has physician follow in 2 days.     - S/sx pregnancy and labor warning signs vs general discomforts discussed  - Fetal movements and kick counts reviewed. Adequate hydration reinforced.  - Did discuss current COVID policies related to outpatient and inpatient visits.   - Anticipatory guidance provided. Patient still planning TOLAC. Had one prior  . Counseled in early pregnancy.   - RTC in 1-3 days for routine prenatal care.

## 2020-07-07 NOTE — PROGRESS NOTES
OB follow up   + fetal movement.  No VB, LOF or UC's.  Phone # [phone]  Preferred pharmacy confirmed.  GBS today

## 2020-07-08 ENCOUNTER — APPOINTMENT (OUTPATIENT)
Dept: RADIOLOGY | Facility: IMAGING CENTER | Age: 30
End: 2020-07-08
Attending: ADVANCED PRACTICE MIDWIFE
Payer: MEDICAID

## 2020-07-08 ENCOUNTER — NON-PROVIDER VISIT (OUTPATIENT)
Dept: HEALTH INFORMATION MANAGEMENT | Facility: MEDICAL CENTER | Age: 30
End: 2020-07-08
Payer: MEDICAID

## 2020-07-08 VITALS — WEIGHT: 202 LBS | HEIGHT: 60 IN | BODY MASS INDEX: 39.66 KG/M2

## 2020-07-08 DIAGNOSIS — O24.419 GESTATIONAL DIABETES MELLITUS (GDM) IN THIRD TRIMESTER, GESTATIONAL DIABETES METHOD OF CONTROL UNSPECIFIED: ICD-10-CM

## 2020-07-08 PROBLEM — O35.BXX0 ECHOGENIC FOCUS OF HEART OF FETUS AFFECTING ANTEPARTUM CARE OF MOTHER: Status: ACTIVE | Noted: 2020-07-08

## 2020-07-08 PROCEDURE — G0109 DIAB MANAGE TRN IND/GROUP: HCPCS | Performed by: INTERNAL MEDICINE

## 2020-07-08 PROCEDURE — 76816 OB US FOLLOW-UP PER FETUS: CPT | Mod: TC | Performed by: ADVANCED PRACTICE MIDWIFE

## 2020-07-08 ASSESSMENT — FIBROSIS 4 INDEX: FIB4 SCORE: 0.36

## 2020-07-08 NOTE — LETTER
July 8, 2020                   Re: Ann Salguero     1990         [id number]       Lizzie Holt C.N.M.  [address] [place] [address]  [place] [address]      Dear :Lizzie Holt C.N.M.    On 7/8/2020, your patient Ann Salguero, received 1 hour of nurse training from the [facility] at [facility] for management of her gestational diabetes.  Her Estimated Date of Delivery: 8/3/20.  We taught the following subjects:    Introduction to gestational diabetes, benefits and responsibilities of patient, physiology of diabetes and the diease process, benefits of blood glucose monitoring and record keeping, medication action and possible side effects, hypoglycemia, sick day management, exercise, stress reduction and travel with diabetes.       Nurse assessment / Education:    Comments:          Weight:Weight: 91.6 kg (202 lb)         Complaints:no      Pathophysiology of diabetes in pregnancy    Discuss  potential maternal and fetal complications in pregnancy with diabetes.     Importance of blood glucose monitoring   Proper testing technique using a True Metrix meter.    At 2:15 pm, the meter read 81, which was 2 hours after eating yogurt.  Testing: fasting and one hour after meals,  expected ranges and rationale for strict control.     Ketone test today:no       Recognition and treatment of hypoglycemia.     Insulin taught: No  Insulin briefly dicussed at this time.    Should patient require insulin later in pregnancy, she would need further education.     Reviewed fetal kick counts and other tests to determine fetal well-being  Discuss benefits and risks of exercise in pregnancy  Discuss when to call Doctor  Discuss sick day care  Importance of wearing diabetes identification      Patient/caregiver appeared to understand the content as demonstrated by appropriate questions.     Ann Salguero was encouraged to discuss this further with you.    Hopefully this will help in your management of her care.   If we can be of further assistance, please feel free to call.    Thank you for the referral.    Sincerely,    Sadie Lopez RN CDE  GDM CLASS  Certified Diabetes Educator

## 2020-07-08 NOTE — PROGRESS NOTES
Ann came to the Gestational Diabetes program.  She states her grandparents mother and aunt all have Type 2 Diabetes.  She denies having a history of Gestational Diabetes with her other pregnancies.  She denies any problems with this pregnancy.  She was supplied a True Metrix meter and shown how to use it.  She was able to do a return demonstration without difficulty. She will keep walking and stay well hydrated with water. Her meal plan is scanned into Media and her Doctor Letters with what was taught can be found under the Letters tab.

## 2020-07-09 ENCOUNTER — ROUTINE PRENATAL (OUTPATIENT)
Dept: OBGYN | Facility: CLINIC | Age: 30
End: 2020-07-09
Payer: MEDICAID

## 2020-07-09 VITALS — WEIGHT: 202 LBS | DIASTOLIC BLOOD PRESSURE: 62 MMHG | SYSTOLIC BLOOD PRESSURE: 110 MMHG | BODY MASS INDEX: 39.45 KG/M2

## 2020-07-09 DIAGNOSIS — O24.419 GESTATIONAL DIABETES MELLITUS (GDM) IN THIRD TRIMESTER, GESTATIONAL DIABETES METHOD OF CONTROL UNSPECIFIED: ICD-10-CM

## 2020-07-09 LAB — GLUCOSE BLD-MCNC: 88 MG/DL (ref 70–100)

## 2020-07-09 PROCEDURE — 90040 PR PRENATAL FOLLOW UP: CPT | Performed by: OBSTETRICS & GYNECOLOGY

## 2020-07-09 PROCEDURE — 82962 GLUCOSE BLOOD TEST: CPT | Performed by: OBSTETRICS & GYNECOLOGY

## 2020-07-09 ASSESSMENT — FIBROSIS 4 INDEX: FIB4 SCORE: 0.36

## 2020-07-09 NOTE — PROGRESS NOTES
"S: Pt 30 y.o.  36w3d here for diabetic OB follow up.   No contractions, leaking, vaginal bleeding.  Good fetal movement.  Reviewed blood sugar log with patient.    Recently diagnosed with GDM and had class yesterday.   Forgot log book but from memory:   \"Fasting 86, after diner was 110  Fasting today 86, post breakfast was 86, and after lunch was 86\"    1.5hrs post lunch accucheck today was 88    Current: none    O:   Vitals:    20 1400   BP: 110/62     Vitals:    20 1400   BP: 110/62   Weight: 91.6 kg (202 lb)       A/P:  30 y.o.  36w3d who presents for obstetric follow-up.  Patient Active Problem List   Diagnosis   • ASTHMA   • Abnormal GTT (glucose tolerance test)   • Gestational diabetes mellitus (GDM), antepartum   • History of  section   • Echogenic focus of heart of fetus affecting antepartum care of mother     # GDMA  - advised to bring book next time and meter to be checked.  - NSTs: 2x/week to begin at 32 weeks.    Desires TOLAC  #GDM  - diabetic class  US : efw [id number], 32%tile    GBS pending ([facility])    - Follow-up in 1 weeks for obstetric diabetic follow-up.          "

## 2020-07-09 NOTE — PROGRESS NOTES
Ann received a 1800 calorie meal plan (based on 18-20 kcal/kg of current weight) consisting of 3 meals and 3 snacks (see media for copy of meal plan).   Pt's prepregnancy weight was: 180lb. She has gained 22lb so far in this pregnancy.   Recommended meal times:   B: 7:30  S: 10:30  L: 12:30  S: 3:30  D: 6:30  S: 9:30   Pt was educated on carbohydrate containing foods vs non carbohydrate containing foods, the importance of small frequent meals, limiting carbohydrate to 1 serving in the morning, no fruit before noon/12pm, and avoiding all concentrated sweets for the remainder of her pregnancy. Explained the importance of not going >4hrs btwn eating during the day and no longer than 10hours overnight. Patient agrees to follow the meal plan and guidelines provided.

## 2020-07-09 NOTE — PROGRESS NOTES
Pt here today for OB follow up  Reports +FM  WT: 202 lb  BP: 110/62  Preferred pharmacy verified with pt.  Random glucose check: 88 (1.5 hours post lunch, pt states had cheese with 2 corn tortillas)  Pt states no complaints or concerns today  Pt forgot her log book and meter today. Was Instructed to bring it to every appt.   Good # [phone]

## 2020-07-16 ENCOUNTER — ROUTINE PRENATAL (OUTPATIENT)
Dept: OBGYN | Facility: CLINIC | Age: 30
End: 2020-07-16
Payer: MEDICAID

## 2020-07-16 VITALS — BODY MASS INDEX: 39.84 KG/M2 | DIASTOLIC BLOOD PRESSURE: 68 MMHG | WEIGHT: 204 LBS | SYSTOLIC BLOOD PRESSURE: 112 MMHG

## 2020-07-16 DIAGNOSIS — J45.909 ASTHMA AFFECTING PREGNANCY IN THIRD TRIMESTER: ICD-10-CM

## 2020-07-16 DIAGNOSIS — Z98.891 HISTORY OF CESAREAN SECTION: ICD-10-CM

## 2020-07-16 DIAGNOSIS — O99.513 ASTHMA AFFECTING PREGNANCY IN THIRD TRIMESTER: ICD-10-CM

## 2020-07-16 DIAGNOSIS — O24.410 DIET CONTROLLED GESTATIONAL DIABETES MELLITUS (GDM), ANTEPARTUM: ICD-10-CM

## 2020-07-16 PROCEDURE — 90040 PR PRENATAL FOLLOW UP: CPT | Performed by: OBSTETRICS & GYNECOLOGY

## 2020-07-16 ASSESSMENT — FIBROSIS 4 INDEX: FIB4 SCORE: 0.36

## 2020-07-16 NOTE — PROGRESS NOTES
S: Pt presents for routine OB follow up.  No contractions, vaginal bleeding, or leaking fluids. Good fetal movement.    BS reviewed  Fastings <95, 1 was 96  1hr pp <130, none elevated    Questions answered.    O: /68   Wt 92.5 kg (204 lb)   BMI 39.84 kg/m²   Patients' weight gain, fluid intake and exercise level discussed.  Vitals, fundal height , fetal position, and FHR reviewed on flowsheet    Lab:  Recent Results (from the past 336 hour(s))   POC UA    Collection Time: 20  6:59 PM   Result Value Ref Range    POC Color Nabila     POC Appearance Slightly Cloudy (A)     POC Glucose Negative Negative mg/dL    POC Ketones Trace (A) Negative mg/dL    POC Specific Gravity >=1.030 (A) 1.005 - 1.030    POC Blood Moderate (A) Negative    POC Urine PH 5.5 5.0 - 8.0    POC Protein 30 (A) Negative mg/dL    POC Nitrites Negative Negative    POC Leukocyte Esterase Small (A) Negative   POC UA    Collection Time: 20  8:27 PM   Result Value Ref Range    POC Color Yellow     POC Appearance Clear     POC Glucose Negative Negative mg/dL    POC Ketones Negative Negative mg/dL    POC Specific Gravity 1.015 1.005 - 1.030    POC Blood Large (A) Negative    POC Urine PH 6.0 5.0 - 8.0    POC Protein Negative Negative mg/dL    POC Nitrites Negative Negative    POC Leukocyte Esterase Large (A) Negative   WET PREP    Collection Time: 20  9:00 PM    Specimen: Vaginal; Genital   Result Value Ref Range    Significant Indicator NEG     Source GEN     Site VAGINAL     Wet Prep For Parasites       Few WBCs seen.  Few clue cells seen.  No yeast.  No motile Trichomonas seen.     POCT Glucose    Collection Time: 20  2:11 PM   Result Value Ref Range    Glucose - Accu-Ck 88 70 - 100 mg/dL       A/P:  30 y.o.  at 37w3d presents for routine obstetric follow-up.  Size equals dates and/or scan  Desires TOLAC  #GDM  - diabetic class  US : efw [id number], 32%tile    GBS pending ([facility])    No insulin needed, no  NSTs  Desires TOLAC  Will schedule IOL at next visit

## 2020-07-16 NOTE — PROGRESS NOTES
Pt here today for OB follow up / Diabetic  +GBs pt informed  Reports +FM  WT: 204 lb  BP: 112/68  Preferred pharmacy verified with pt.  Pt states no complaints or concerns today  Good # [phone]

## 2020-07-18 LAB — STREP GP B DNA PCR: POSITIVE

## 2020-07-23 ENCOUNTER — NON-PROVIDER VISIT (OUTPATIENT)
Dept: OBGYN | Facility: CLINIC | Age: 30
End: 2020-07-23
Payer: MEDICAID

## 2020-07-23 ENCOUNTER — ROUTINE PRENATAL (OUTPATIENT)
Dept: OBGYN | Facility: CLINIC | Age: 30
End: 2020-07-23
Payer: MEDICAID

## 2020-07-23 VITALS — BODY MASS INDEX: 40.43 KG/M2 | WEIGHT: 207 LBS | SYSTOLIC BLOOD PRESSURE: 102 MMHG | DIASTOLIC BLOOD PRESSURE: 68 MMHG

## 2020-07-23 DIAGNOSIS — Z98.891 HISTORY OF CESAREAN SECTION: ICD-10-CM

## 2020-07-23 DIAGNOSIS — O24.419 GESTATIONAL DIABETES MELLITUS (GDM), ANTEPARTUM, GESTATIONAL DIABETES METHOD OF CONTROL UNSPECIFIED: ICD-10-CM

## 2020-07-23 DIAGNOSIS — O24.419 GESTATIONAL DIABETES MELLITUS (GDM) IN THIRD TRIMESTER, GESTATIONAL DIABETES METHOD OF CONTROL UNSPECIFIED: ICD-10-CM

## 2020-07-23 PROCEDURE — 90040 PR PRENATAL FOLLOW UP: CPT | Performed by: OBSTETRICS & GYNECOLOGY

## 2020-07-23 PROCEDURE — 97802 MEDICAL NUTRITION INDIV IN: CPT | Performed by: DIETITIAN, REGISTERED

## 2020-07-23 ASSESSMENT — FIBROSIS 4 INDEX: FIB4 SCORE: 0.36

## 2020-07-23 NOTE — NON-PROVIDER
OB follow up/GDM A1   + fetal movement.  No VB, LOF or UC's.  Wt: 207      BP: 102/68  Phone # [phone]  C/o feeling some pressure, and having some period like cramping, and some occasional leg pain.  Preferred pharmacy confirmed.  GBS positive.  Patient states that she would like to be checked today for dilation.

## 2020-07-23 NOTE — PROGRESS NOTES
S: Pt 30 y.o.  38w3d here for diabetic OB follow up.   No contractions, leaking, vaginal bleeding.  Good fetal movement.  Reviewed blood sugar log with patient.  Reviewed diet.  Questions answered.  Patient states last time the previous provider stated that she could have an induction of labor and is requesting 1 for a .      Patient # High Emy Targets   Fasting 87-98 1 <90   1 hr PP  0 <130-140   2 hr PP   <120     Current Insulin regimen: none    O:   Vitals:    20   BP: 102/     Vitals:    20   BP: 102   Weight: 93.9 kg (207 lb)       A/P:  30 y.o.  38w3d with GDMA1 who presents for obstetric follow-up.  Patient Active Problem List   Diagnosis   • Asthma affecting pregnancy in third trimester   • Abnormal GTT (glucose tolerance test)   • Gestational diabetes mellitus (GDM), antepartum   • History of  section   • Echogenic focus of heart of fetus affecting antepartum care of mother     # GDMA  -  Cont diet control  - NSTs: 2x/week to begin at 32 weeks.    Desires TOLAC  #GDM  - diabetic class  US : efw [id number], 32%tile    GBS positive      -I discussed with the patient that the best possible chance of vaginal delivery for her is to go into labor on her own however she is requesting an induction today.  I have requested a 30-day week  induction with no prostaglandins and Pitocin only for .  # Postpartum:   - repeat glucose testing 6 weeks postpartum    - Follow-up in 1 weeks for obstetric diabetic follow-up.

## 2020-07-23 NOTE — PROGRESS NOTES
Initial Nutrition Assessment   Referring Provider: Lizzie Holt C.N.M.  Time: 9:30-9:39 am       Subjective:  -Loves [employer], but after taking class has been following recommendations to completely avoid along with juice and any other sweetened beverages  -No sweets  -Checking 6x/day     Objective:   Anthropometrics:  Today's weight: 207 lb  Pre-pregnancy weight: 180 lb  Total weight gain: 27 lb  Weeks gestation: 38w3d    Biochemical data, medical test and procedures:  No results found for: HBA1C@  Lab Results   Component Value Date/Time    POCGLUCOSE 88 07/09/2020 02:11 PM       OGTT Results: 1 hrs 136   No A1C yet (pt states she went yesterday to [employer] [facility])    Glucose Log Book (most recent):    Fasting glucose range: WNL   1 hour glucose post prandial range: WNL    Assessment:   Nutrition Diagnosis (PES Statement):  · Altered nutrition related lab values related to endocrine dysfunction as evidenced by OGTT    Recommended Diet:  1800 calorie meal plan (see media for detailed meal plan)  3 meals and 3 snacks   1 carb choice at breakfast w/ 2oz protein and 1 fat   No concentrated sweets for remainder of pregnancy   No fruit in the morning   No sweetened beverages  No alcohol in pregnancy   Do not go exceed 4 hours during day or >10 hours overnight without eating     Assessment Notes:   Pt is avoiding all simple sugars as instructed and reports following recommendations from GDM class. Discussed beverage alternatives and instructed pt to only check blood glucose 4x a day (fasting & 1 hr after meals).     Plan: Monitoring & Evaluation  Goals:  1. Follow meal plan as outlined above; 3 meals and 3 snacks daily.   2. Check FSBS 4 times a day (fasting and 1 hour after each meal)  3. FSBS Goals= Fasting <90; 1 hour PP <130   4. Bring blood sugar log book to each appointment   5. Appropriate weight gain during pregnancy       Follow up REYNALDO Montoya, T[place], LD  [phone]

## 2020-07-28 ENCOUNTER — APPOINTMENT (OUTPATIENT)
Dept: OBGYN | Facility: MEDICAL CENTER | Age: 30
End: 2020-07-28
Attending: OBSTETRICS & GYNECOLOGY
Payer: MEDICAID

## 2020-07-28 ENCOUNTER — HOSPITAL ENCOUNTER (INPATIENT)
Facility: MEDICAL CENTER | Age: 30
LOS: 3 days | End: 2020-07-31
Attending: OBSTETRICS & GYNECOLOGY | Admitting: OBSTETRICS & GYNECOLOGY
Payer: MEDICAID

## 2020-07-28 ENCOUNTER — ANESTHESIA EVENT (OUTPATIENT)
Dept: ANESTHESIOLOGY | Facility: MEDICAL CENTER | Age: 30
End: 2020-07-28
Payer: MEDICAID

## 2020-07-28 ENCOUNTER — ANESTHESIA (OUTPATIENT)
Dept: ANESTHESIOLOGY | Facility: MEDICAL CENTER | Age: 30
End: 2020-07-28
Payer: MEDICAID

## 2020-07-28 PROBLEM — E66.9 OBESITY: Status: ACTIVE | Noted: 2020-07-28

## 2020-07-28 LAB
ALBUMIN SERPL BCP-MCNC: 3.6 G/DL (ref 3.2–4.9)
ALBUMIN/GLOB SERPL: 1 G/DL
ALP SERPL-CCNC: 121 U/L (ref 30–99)
ALT SERPL-CCNC: 17 U/L (ref 2–50)
ANION GAP SERPL CALC-SCNC: 14 MMOL/L (ref 7–16)
AST SERPL-CCNC: 18 U/L (ref 12–45)
BASOPHILS # BLD AUTO: 0.3 % (ref 0–1.8)
BASOPHILS # BLD: 0.03 K/UL (ref 0–0.12)
BILIRUB SERPL-MCNC: 0.7 MG/DL (ref 0.1–1.5)
BUN SERPL-MCNC: 8 MG/DL (ref 8–22)
CALCIUM SERPL-MCNC: 9.5 MG/DL (ref 8.5–10.5)
CHLORIDE SERPL-SCNC: 102 MMOL/L (ref 96–112)
CO2 SERPL-SCNC: 19 MMOL/L (ref 20–33)
COVID ORDER STATUS COVID19: NORMAL
CREAT SERPL-MCNC: 0.43 MG/DL (ref 0.5–1.4)
CREAT UR-MCNC: 148.2 MG/DL
EOSINOPHIL # BLD AUTO: 0.07 K/UL (ref 0–0.51)
EOSINOPHIL NFR BLD: 0.6 % (ref 0–6.9)
ERYTHROCYTE [DISTWIDTH] IN BLOOD BY AUTOMATED COUNT: 42.8 FL (ref 35.9–50)
GLOBULIN SER CALC-MCNC: 3.7 G/DL (ref 1.9–3.5)
GLUCOSE BLD-MCNC: 83 MG/DL (ref 65–99)
GLUCOSE SERPL-MCNC: 82 MG/DL (ref 65–99)
HCT VFR BLD AUTO: 39.2 % (ref 37–47)
HGB BLD-MCNC: 13 G/DL (ref 12–16)
HOLDING TUBE BB 8507: NORMAL
IMM GRANULOCYTES # BLD AUTO: 0.11 K/UL (ref 0–0.11)
IMM GRANULOCYTES NFR BLD AUTO: 1 % (ref 0–0.9)
LYMPHOCYTES # BLD AUTO: 1.93 K/UL (ref 1–4.8)
LYMPHOCYTES NFR BLD: 16.8 % (ref 22–41)
MCH RBC QN AUTO: 29.2 PG (ref 27–33)
MCHC RBC AUTO-ENTMCNC: 33.2 G/DL (ref 33.6–35)
MCV RBC AUTO: 88.1 FL (ref 81.4–97.8)
MONOCYTES # BLD AUTO: 0.54 K/UL (ref 0–0.85)
MONOCYTES NFR BLD AUTO: 4.7 % (ref 0–13.4)
NEUTROPHILS # BLD AUTO: 8.83 K/UL (ref 2–7.15)
NEUTROPHILS NFR BLD: 76.6 % (ref 44–72)
NRBC # BLD AUTO: 0 K/UL
NRBC BLD-RTO: 0 /100 WBC
PLATELET # BLD AUTO: 297 K/UL (ref 164–446)
PMV BLD AUTO: 9.8 FL (ref 9–12.9)
POTASSIUM SERPL-SCNC: 3.8 MMOL/L (ref 3.6–5.5)
PROT SERPL-MCNC: 7.3 G/DL (ref 6–8.2)
PROT UR-MCNC: 23 MG/DL (ref 0–15)
PROT/CREAT UR: 155 MG/G (ref 10–107)
RBC # BLD AUTO: 4.45 M/UL (ref 4.2–5.4)
SARS-COV-2 RDRP RESP QL NAA+PROBE: NOTDETECTED
SODIUM SERPL-SCNC: 135 MMOL/L (ref 135–145)
SPECIMEN SOURCE: NORMAL
URATE SERPL-MCNC: 7.3 MG/DL (ref 1.9–8.2)
WBC # BLD AUTO: 11.5 K/UL (ref 4.8–10.8)

## 2020-07-28 PROCEDURE — 700105 HCHG RX REV CODE 258: Performed by: OBSTETRICS & GYNECOLOGY

## 2020-07-28 PROCEDURE — 10907ZC DRAINAGE OF AMNIOTIC FLUID, THERAPEUTIC FROM PRODUCTS OF CONCEPTION, VIA NATURAL OR ARTIFICIAL OPENING: ICD-10-PCS | Performed by: OBSTETRICS & GYNECOLOGY

## 2020-07-28 PROCEDURE — C1726 CATH, BAL DIL, NON-VASCULAR: HCPCS

## 2020-07-28 PROCEDURE — 84156 ASSAY OF PROTEIN URINE: CPT

## 2020-07-28 PROCEDURE — U0004 COV-19 TEST NON-CDC HGH THRU: HCPCS

## 2020-07-28 PROCEDURE — 82570 ASSAY OF URINE CREATININE: CPT

## 2020-07-28 PROCEDURE — 80053 COMPREHEN METABOLIC PANEL: CPT

## 2020-07-28 PROCEDURE — 85025 COMPLETE CBC W/AUTO DIFF WBC: CPT

## 2020-07-28 PROCEDURE — 82962 GLUCOSE BLOOD TEST: CPT

## 2020-07-28 PROCEDURE — 500726 HCHG BAKRI TAPENADE BALLOON

## 2020-07-28 PROCEDURE — 84550 ASSAY OF BLOOD/URIC ACID: CPT

## 2020-07-28 PROCEDURE — 700111 HCHG RX REV CODE 636 W/ 250 OVERRIDE (IP)

## 2020-07-28 PROCEDURE — C9803 HOPD COVID-19 SPEC COLLECT: HCPCS | Performed by: OBSTETRICS & GYNECOLOGY

## 2020-07-28 PROCEDURE — 10H07YZ INSERTION OF OTHER DEVICE INTO PRODUCTS OF CONCEPTION, VIA NATURAL OR ARTIFICIAL OPENING: ICD-10-PCS | Performed by: OBSTETRICS & GYNECOLOGY

## 2020-07-28 PROCEDURE — 3E033VJ INTRODUCTION OF OTHER HORMONE INTO PERIPHERAL VEIN, PERCUTANEOUS APPROACH: ICD-10-PCS | Performed by: OBSTETRICS & GYNECOLOGY

## 2020-07-28 PROCEDURE — 700105 HCHG RX REV CODE 258

## 2020-07-28 PROCEDURE — 700101 HCHG RX REV CODE 250: Performed by: ANESTHESIOLOGY

## 2020-07-28 PROCEDURE — 700111 HCHG RX REV CODE 636 W/ 250 OVERRIDE (IP): Performed by: OBSTETRICS & GYNECOLOGY

## 2020-07-28 PROCEDURE — 36415 COLL VENOUS BLD VENIPUNCTURE: CPT

## 2020-07-28 PROCEDURE — 770002 HCHG ROOM/CARE - OB PRIVATE (112)

## 2020-07-28 PROCEDURE — 59200 INSERT CERVICAL DILATOR: CPT

## 2020-07-28 RX ORDER — ROPIVACAINE HYDROCHLORIDE 2 MG/ML
INJECTION, SOLUTION EPIDURAL; INFILTRATION; PERINEURAL
Status: COMPLETED
Start: 2020-07-28 | End: 2020-07-28

## 2020-07-28 RX ORDER — ALUMINA, MAGNESIA, AND SIMETHICONE 2400; 2400; 240 MG/30ML; MG/30ML; MG/30ML
30 SUSPENSION ORAL EVERY 6 HOURS PRN
Status: DISCONTINUED | OUTPATIENT
Start: 2020-07-28 | End: 2020-07-29 | Stop reason: HOSPADM

## 2020-07-28 RX ORDER — SODIUM CHLORIDE, SODIUM LACTATE, POTASSIUM CHLORIDE, AND CALCIUM CHLORIDE .6; .31; .03; .02 G/100ML; G/100ML; G/100ML; G/100ML
250 INJECTION, SOLUTION INTRAVENOUS PRN
Status: DISCONTINUED | OUTPATIENT
Start: 2020-07-28 | End: 2020-07-29

## 2020-07-28 RX ORDER — SODIUM CHLORIDE, SODIUM LACTATE, POTASSIUM CHLORIDE, CALCIUM CHLORIDE 600; 310; 30; 20 MG/100ML; MG/100ML; MG/100ML; MG/100ML
INJECTION, SOLUTION INTRAVENOUS
Status: COMPLETED
Start: 2020-07-28 | End: 2020-07-28

## 2020-07-28 RX ORDER — HYDROXYZINE 50 MG/1
50 TABLET, FILM COATED ORAL EVERY 6 HOURS PRN
Status: DISCONTINUED | OUTPATIENT
Start: 2020-07-28 | End: 2020-07-29 | Stop reason: HOSPADM

## 2020-07-28 RX ORDER — ROPIVACAINE HYDROCHLORIDE 2 MG/ML
INJECTION, SOLUTION EPIDURAL; INFILTRATION; PERINEURAL CONTINUOUS
Status: DISCONTINUED | OUTPATIENT
Start: 2020-07-28 | End: 2020-07-29

## 2020-07-28 RX ORDER — SODIUM CHLORIDE, SODIUM LACTATE, POTASSIUM CHLORIDE, CALCIUM CHLORIDE 600; 310; 30; 20 MG/100ML; MG/100ML; MG/100ML; MG/100ML
INJECTION, SOLUTION INTRAVENOUS CONTINUOUS
Status: ACTIVE | OUTPATIENT
Start: 2020-07-28 | End: 2020-07-28

## 2020-07-28 RX ORDER — SODIUM CHLORIDE 9 MG/ML
INJECTION, SOLUTION INTRAVENOUS
Status: COMPLETED
Start: 2020-07-28 | End: 2020-07-28

## 2020-07-28 RX ORDER — LIDOCAINE HYDROCHLORIDE AND EPINEPHRINE 15; 5 MG/ML; UG/ML
INJECTION, SOLUTION EPIDURAL
Status: COMPLETED | OUTPATIENT
Start: 2020-07-28 | End: 2020-07-28

## 2020-07-28 RX ORDER — PENICILLIN G POTASSIUM 5000000 [IU]/1
INJECTION, POWDER, FOR SOLUTION INTRAMUSCULAR; INTRAVENOUS
Status: COMPLETED
Start: 2020-07-28 | End: 2020-07-28

## 2020-07-28 RX ORDER — MISOPROSTOL 200 UG/1
800 TABLET ORAL
Status: DISCONTINUED | OUTPATIENT
Start: 2020-07-28 | End: 2020-07-29 | Stop reason: HOSPADM

## 2020-07-28 RX ORDER — SODIUM CHLORIDE, SODIUM LACTATE, POTASSIUM CHLORIDE, AND CALCIUM CHLORIDE .6; .31; .03; .02 G/100ML; G/100ML; G/100ML; G/100ML
1000 INJECTION, SOLUTION INTRAVENOUS
Status: COMPLETED | OUTPATIENT
Start: 2020-07-28 | End: 2020-07-28

## 2020-07-28 RX ADMIN — SODIUM CHLORIDE 2.5 MILLION UNITS: 9 INJECTION, SOLUTION INTRAVENOUS at 19:15

## 2020-07-28 RX ADMIN — ROPIVACAINE HYDROCHLORIDE: 2 INJECTION, SOLUTION EPIDURAL; INFILTRATION at 20:42

## 2020-07-28 RX ADMIN — SODIUM CHLORIDE 2.5 MILLION UNITS: 9 INJECTION, SOLUTION INTRAVENOUS at 23:12

## 2020-07-28 RX ADMIN — LIDOCAINE HYDROCHLORIDE,EPINEPHRINE BITARTRATE 5 ML: 15; .005 INJECTION, SOLUTION EPIDURAL; INFILTRATION; INTRACAUDAL; PERINEURAL at 20:23

## 2020-07-28 RX ADMIN — OXYTOCIN 1 MILLI-UNITS/MIN: 10 INJECTION, SOLUTION INTRAMUSCULAR; INTRAVENOUS at 15:09

## 2020-07-28 RX ADMIN — SODIUM CHLORIDE, POTASSIUM CHLORIDE, SODIUM LACTATE AND CALCIUM CHLORIDE 1000 ML: 600; 310; 30; 20 INJECTION, SOLUTION INTRAVENOUS at 11:18

## 2020-07-28 RX ADMIN — SODIUM CHLORIDE 2.5 MILLION UNITS: 9 INJECTION, SOLUTION INTRAVENOUS at 15:09

## 2020-07-28 RX ADMIN — SODIUM CHLORIDE, SODIUM LACTATE, POTASSIUM CHLORIDE, AND CALCIUM CHLORIDE 1000 ML: .6; .31; .03; .02 INJECTION, SOLUTION INTRAVENOUS at 18:57

## 2020-07-28 RX ADMIN — SODIUM CHLORIDE 5 MILLION UNITS: 900 INJECTION INTRAVENOUS at 11:26

## 2020-07-28 RX ADMIN — SODIUM CHLORIDE, SODIUM LACTATE, POTASSIUM CHLORIDE, CALCIUM CHLORIDE 1000 ML: 600; 310; 30; 20 INJECTION, SOLUTION INTRAVENOUS at 11:18

## 2020-07-28 RX ADMIN — SODIUM CHLORIDE, POTASSIUM CHLORIDE, SODIUM LACTATE AND CALCIUM CHLORIDE 1000 ML: 600; 310; 30; 20 INJECTION, SOLUTION INTRAVENOUS at 18:57

## 2020-07-28 SDOH — ECONOMIC STABILITY: TRANSPORTATION INSECURITY
IN THE PAST 12 MONTHS, HAS LACK OF TRANSPORTATION KEPT YOU FROM MEETINGS, WORK, OR FROM GETTING THINGS NEEDED FOR DAILY LIVING?: NO

## 2020-07-28 SDOH — ECONOMIC STABILITY: FOOD INSECURITY: WITHIN THE PAST 12 MONTHS, THE FOOD YOU BOUGHT JUST DIDN'T LAST AND YOU DIDN'T HAVE MONEY TO GET MORE.: NEVER TRUE

## 2020-07-28 SDOH — ECONOMIC STABILITY: TRANSPORTATION INSECURITY
IN THE PAST 12 MONTHS, HAS THE LACK OF TRANSPORTATION KEPT YOU FROM MEDICAL APPOINTMENTS OR FROM GETTING MEDICATIONS?: NO

## 2020-07-28 SDOH — ECONOMIC STABILITY: FOOD INSECURITY: WITHIN THE PAST 12 MONTHS, YOU WORRIED THAT YOUR FOOD WOULD RUN OUT BEFORE YOU GOT MONEY TO BUY MORE.: NEVER TRUE

## 2020-07-28 ASSESSMENT — COPD QUESTIONNAIRES
HAVE YOU SMOKED AT LEAST 100 CIGARETTES IN YOUR ENTIRE LIFE: NO/DON'T KNOW
COPD SCREENING SCORE: 0
DO YOU EVER COUGH UP ANY MUCUS OR PHLEGM?: NO/ONLY WITH OCCASIONAL COLDS OR INFECTIONS
IN THE PAST 12 MONTHS DO YOU DO LESS THAN YOU USED TO BECAUSE OF YOUR BREATHING PROBLEMS: DISAGREE/UNSURE
DURING THE PAST 4 WEEKS HOW MUCH DID YOU FEEL SHORT OF BREATH: NONE/LITTLE OF THE TIME

## 2020-07-28 ASSESSMENT — LIFESTYLE VARIABLES
EVER_SMOKED: NEVER
ALCOHOL_USE: NO

## 2020-07-28 ASSESSMENT — PAIN SCALES - GENERAL: PAINLEVEL: 0 - NO PAIN

## 2020-07-28 ASSESSMENT — FIBROSIS 4 INDEX: FIB4 SCORE: 0.36

## 2020-07-28 ASSESSMENT — PATIENT HEALTH QUESTIONNAIRE - PHQ9
SUM OF ALL RESPONSES TO PHQ9 QUESTIONS 1 AND 2: 0
2. FEELING DOWN, DEPRESSED, IRRITABLE, OR HOPELESS: NOT AT ALL
1. LITTLE INTEREST OR PLEASURE IN DOING THINGS: NOT AT ALL

## 2020-07-28 NOTE — CARE PLAN
Problem: Pain  Goal: Alleviation of Pain or a reduction in pain to the patient's comfort goal  Outcome: PROGRESSING AS EXPECTED  Note: Pt denies pain at this time, options discussed       Problem: Risk for Infection, Impaired Wound Healing  Goal: Remain free from signs and symptoms of infection  Outcome: PROGRESSING AS EXPECTED  Note: No s/s of infection noted, pt remains afebrile

## 2020-07-28 NOTE — H&P
.  LABOR AND DELIVERY HISTORY AND PHYSICAL    PATIENT ID:  NAME:  Ann Salguero  MRN:               [id number]  YOB: 1990    CC:  Ann Salguero a 31 yo female  presents for IOL for GDM.    HPI:  Ann Salguero is a 30 y.o. female  at 39w1d. No LMP recorded. Patient is pregnant. Patient presents with no complaints of uterine contractions and no loss of fluid  Estimated Date of Delivery: 8/3/20. Good fetal movement.  No vaginal bleeding.  Pregnancy was complicated by GDM.     ROS: Patient denies any fever chills, nausea, vomiting, headache, chest pain, shortness of breath, or dysuria or unusual swelling of hands or feet.     Prenatal Care: Obtained at [facility], 1st visit 2020 with 12 total visits.  Third trimester BPs were approximately 110/60.  Total weight gain 3.2 kg during the pregnancy.     Prenatal Labs:   HepBsAg: Negative HIV: Non Reactive Rubella: 0.99 Non Immune   RPR:  Non Reactive PAP: Normal GBS: Positive   GC/CT: Negative RH + / Ab Negative Quad Screen: N/A   Blood type: A Positive    Recent Labs     20  1115   WBC 11.5*   RBC 4.45   HEMOGLOBIN 13.0   HEMATOCRIT 39.2   MCV 88.1   MCH 29.2   RDW 42.8   PLATELETCT 297   MPV 9.8   NEUTSPOLYS 76.60*   LYMPHOCYTES 16.80*   MONOCYTES 4.70   EOSINOPHILS 0.60   BASOPHILS 0.30     Recent Labs     20  1115   SODIUM 135   POTASSIUM 3.8   CHLORIDE 102   CO2 19*   GLUCOSE 82   BUN 8          IMAGING:  OB ultrasound:       POB Hx:  OB History    Para Term  AB Living   4 3 3     4   SAB TAB Ectopic Molar Multiple Live Births           1 4      # Outcome Date GA Lbr Dao/2nd Weight Sex Delivery Anes PTL Lv   4 Current            3A Term 12/29/10 38w0d  2.268 kg (5 lb) F CS-Unspec EPI N ZULEIKA      Birth Comments: , breastfeeding    3B Term 12/29/10 38w0d  2.722 kg (6 lb) M CS-Unspec EPI N ZULEIKA      Birth Comments: , breastfeeding    2 Term 10/07/07 40w0d  2.892 kg (6 lb 6 oz) F Vag-Spont EPI N ZULEIKA      Birth Comments:  NO COMPLICATIONS,  4 MOS.    1 Term 06 40w0d  2.892 kg (6 lb 6 oz) F Vag-Spont EPI N ZULEIKA      Birth Comments: NO COMPLICATIONS,  4 MOS.        PMH/Problem List:    Past Medical History:   Diagnosis Date   • ASTHMA     DX @ 4 YRS OLD   • Gestational diabetes mellitus (GDM), antepartum 7/3/2020     Patient Active Problem List    Diagnosis Date Noted   • Echogenic focus of heart of fetus affecting antepartum care of mother 2020   • History of  section 2020   • Gestational diabetes mellitus (GDM), antepartum 2020   • Abnormal GTT (glucose tolerance test) 2020   • Asthma affecting pregnancy in third trimester        Current Outpatient Medications:  No current facility-administered medications on file prior to encounter.      Current Outpatient Medications on File Prior to Encounter   Medication Sig Dispense Refill   • albuterol 108 (90 Base) MCG/ACT Aero Soln inhalation aerosol Inhale 2 Puffs by mouth every 6 hours as needed. 8.5 g 2   • DULERA 50-5 MCG/ACT Aerosol Take 1 Puff by mouth every day.     • Prenatal Vit-Fe Fumarate-FA (PRENATAL 1+1 PO) Take  by mouth.     • albuterol (VENTOLIN OR PROVENTIL) 108 (90 BASE) MCG/ACT AERS Inhale 2 Puffs by mouth every 6 hours as needed for Shortness of Breath. 8.5 g 0       PSH:    Past Surgical History:   Procedure Laterality Date   • PRIMARY C SECTION  2010    Performed by JEFFYR SALVADOR at LABOR AND DELIVERY       Allergies:   Allergies   Allergen Reactions   • Pcn [Penicillins] Rash and Itching       SH:  Social History     Socioeconomic History   • Marital status:      Spouse name: Not on file   • Number of children: Not on file   • Years of education: Not on file   • Highest education level: Not on file   Occupational History   • Not on file   Social Needs   • Financial resource strain: Not on file   • Food insecurity     Worry: Never true     Inability: Never true   • Transportation needs      Medical: No     Non-medical: No   Tobacco Use   • Smoking status: Never Smoker   • Smokeless tobacco: Never Used   Substance and Sexual Activity   • Alcohol use: No   • Drug use: No   • Sexual activity: Yes     Partners: Male     Birth control/protection: None   Lifestyle   • Physical activity     Days per week: Not on file     Minutes per session: Not on file   • Stress: Not on file   Relationships   • Social connections     Talks on phone: Not on file     Gets together: Not on file     Attends [employer] service: Not on file     Active member of club or organization: Not on file     Attends meetings of clubs or organizations: Not on file     Relationship status: Not on file   • Intimate partner violence     Fear of current or ex partner: Not on file     Emotionally abused: Not on file     Physically abused: Not on file     Forced sexual activity: Not on file   Other Topics Concern   • Not on file   Social History Narrative   • Not on file         PHYSICAL EXAM:  Vitals:    20 1023 20 1038 20 1054 20 1108   BP: 128/73 127/77 127/61 121/72   Pulse: 80 85 81 75   Resp:       Temp:       TempSrc:       Weight:       Height:         Temp (24hrs), Av.6 °C (97.8 °F), Min:36.6 °C (97.8 °F), Max:36.6 °C (97.8 °F)    General: No acute distress, resting comfortably in bed.  HEENT: normocephalic, nontraumatic, PERRLA, EOMI  Cardiovascular: Heart RRR with no murmurs, rubs or gallops. Distal Pulses 2+  Respiratory: symmetric chest expansion, lungs CTA bilaterally with no wheezes rales or rhonci  Abdomen: gravid, nontender  Musculoskeletal: strength 5/5 in four extremities  Neuro: non focal with no numbness, tingling or changes in sensation    SVE:/3      A/P: Intrauterine pregancy at 39w1d weeks labor here for IOL for GDM.  1. IUP at term  2. Patient is GBS +, was started on penicillin (will monitor response)  3. Anticipating

## 2020-07-28 NOTE — PROGRESS NOTES
[facility] - 8/3 EGA - 39.1    0920 - Pt arrived to labor and delivery for IOL for GDM. Pt placed in room 220. External monitors in place X2. Category I FHT at this time. BP high on admission. Pt denies visual changes epigastric pain and swelling states she has had some occasional HAs and dizziness. Pt reports good FM. No complaints of contractions, ROM or vaginal bleeding. FOB at bedside. POC discussed with pt and family members, all questions answered.   0936 Admission procedures complete.  0945 Dr. Dang notified of pt status.  1007 Dr. Dang at bedside. SVE 1/thick/high. Orders received at this time  1045 Dr. Richardson at bedside. Ultrasound confirmation of vertex position. Cooks balloon inserted. 60-uterine/40 vaginal. Will deflate vaginal side in 4 hours to check status.  1118 IV started, labs drawn  1120 Covid test obtained.  1500 Vaginal balloon deflated, SVE 3/70. Vaginal balloon re-inflated.  1509 Pitocin started.  1630 Pt feeling more intense cramping.  1815 Balloon tugged on. Balloon out at this time. SVE 5/100/-2. Dr. Richardson notified.  1900 Report given to ALECIA Breen RN

## 2020-07-29 LAB
ERYTHROCYTE [DISTWIDTH] IN BLOOD BY AUTOMATED COUNT: 45.1 FL (ref 35.9–50)
HCT VFR BLD AUTO: 38.2 % (ref 37–47)
HGB BLD-MCNC: 12.6 G/DL (ref 12–16)
MCH RBC QN AUTO: 29.3 PG (ref 27–33)
MCHC RBC AUTO-ENTMCNC: 33 G/DL (ref 33.6–35)
MCV RBC AUTO: 88.8 FL (ref 81.4–97.8)
PLATELET # BLD AUTO: 278 K/UL (ref 164–446)
PMV BLD AUTO: 9.8 FL (ref 9–12.9)
RBC # BLD AUTO: 4.3 M/UL (ref 4.2–5.4)
WBC # BLD AUTO: 21.4 K/UL (ref 4.8–10.8)

## 2020-07-29 PROCEDURE — 304965 HCHG RECOVERY SERVICES

## 2020-07-29 PROCEDURE — 700105 HCHG RX REV CODE 258: Performed by: OBSTETRICS & GYNECOLOGY

## 2020-07-29 PROCEDURE — 770002 HCHG ROOM/CARE - OB PRIVATE (112)

## 2020-07-29 PROCEDURE — 700102 HCHG RX REV CODE 250 W/ 637 OVERRIDE(OP): Performed by: OBSTETRICS & GYNECOLOGY

## 2020-07-29 PROCEDURE — 59409 OBSTETRICAL CARE: CPT

## 2020-07-29 PROCEDURE — 303615 HCHG EPIDURAL/SPINAL ANESTHESIA FOR LABOR

## 2020-07-29 PROCEDURE — A9270 NON-COVERED ITEM OR SERVICE: HCPCS | Performed by: OBSTETRICS & GYNECOLOGY

## 2020-07-29 PROCEDURE — 700111 HCHG RX REV CODE 636 W/ 250 OVERRIDE (IP): Performed by: ANESTHESIOLOGY

## 2020-07-29 PROCEDURE — 700111 HCHG RX REV CODE 636 W/ 250 OVERRIDE (IP): Performed by: OBSTETRICS & GYNECOLOGY

## 2020-07-29 PROCEDURE — 85027 COMPLETE CBC AUTOMATED: CPT

## 2020-07-29 PROCEDURE — 36415 COLL VENOUS BLD VENIPUNCTURE: CPT

## 2020-07-29 RX ORDER — IBUPROFEN 600 MG/1
600 TABLET ORAL EVERY 6 HOURS PRN
Status: DISCONTINUED | OUTPATIENT
Start: 2020-07-29 | End: 2020-07-31 | Stop reason: HOSPADM

## 2020-07-29 RX ORDER — DOCUSATE SODIUM 100 MG/1
100 CAPSULE, LIQUID FILLED ORAL 2 TIMES DAILY PRN
Status: DISCONTINUED | OUTPATIENT
Start: 2020-07-29 | End: 2020-07-31 | Stop reason: HOSPADM

## 2020-07-29 RX ORDER — MISOPROSTOL 200 UG/1
600 TABLET ORAL
Status: DISCONTINUED | OUTPATIENT
Start: 2020-07-29 | End: 2020-07-31 | Stop reason: HOSPADM

## 2020-07-29 RX ORDER — METHYLERGONOVINE MALEATE 0.2 MG/ML
0.2 INJECTION INTRAVENOUS
Status: DISCONTINUED | OUTPATIENT
Start: 2020-07-29 | End: 2020-07-31 | Stop reason: HOSPADM

## 2020-07-29 RX ORDER — ONDANSETRON 4 MG/1
4 TABLET, ORALLY DISINTEGRATING ORAL EVERY 6 HOURS PRN
Status: DISCONTINUED | OUTPATIENT
Start: 2020-07-29 | End: 2020-07-31 | Stop reason: HOSPADM

## 2020-07-29 RX ORDER — ONDANSETRON 2 MG/ML
4 INJECTION INTRAMUSCULAR; INTRAVENOUS EVERY 6 HOURS PRN
Status: DISCONTINUED | OUTPATIENT
Start: 2020-07-29 | End: 2020-07-31 | Stop reason: HOSPADM

## 2020-07-29 RX ORDER — BISACODYL 10 MG
10 SUPPOSITORY, RECTAL RECTAL PRN
Status: DISCONTINUED | OUTPATIENT
Start: 2020-07-29 | End: 2020-07-31 | Stop reason: HOSPADM

## 2020-07-29 RX ORDER — ACETAMINOPHEN 325 MG/1
325 TABLET ORAL EVERY 4 HOURS PRN
Status: DISCONTINUED | OUTPATIENT
Start: 2020-07-29 | End: 2020-07-31 | Stop reason: HOSPADM

## 2020-07-29 RX ORDER — VITAMIN A ACETATE, BETA CAROTENE, ASCORBIC ACID, CHOLECALCIFEROL, .ALPHA.-TOCOPHEROL ACETATE, DL-, THIAMINE MONONITRATE, RIBOFLAVIN, NIACINAMIDE, PYRIDOXINE HYDROCHLORIDE, FOLIC ACID, CYANOCOBALAMIN, CALCIUM CARBONATE, FERROUS FUMARATE, ZINC OXIDE, CUPRIC OXIDE 3080; 12; 120; 400; 1; 1.84; 3; 20; 22; 920; 25; 200; 27; 10; 2 [IU]/1; UG/1; MG/1; [IU]/1; MG/1; MG/1; MG/1; MG/1; MG/1; [IU]/1; MG/1; MG/1; MG/1; MG/1; MG/1
1 TABLET, FILM COATED ORAL
Status: DISCONTINUED | OUTPATIENT
Start: 2020-07-29 | End: 2020-07-31 | Stop reason: HOSPADM

## 2020-07-29 RX ORDER — SODIUM CHLORIDE, SODIUM LACTATE, POTASSIUM CHLORIDE, CALCIUM CHLORIDE 600; 310; 30; 20 MG/100ML; MG/100ML; MG/100ML; MG/100ML
INJECTION, SOLUTION INTRAVENOUS PRN
Status: DISCONTINUED | OUTPATIENT
Start: 2020-07-29 | End: 2020-07-31 | Stop reason: HOSPADM

## 2020-07-29 RX ADMIN — SODIUM CHLORIDE 2.5 MILLION UNITS: 9 INJECTION, SOLUTION INTRAVENOUS at 03:18

## 2020-07-29 RX ADMIN — OXYTOCIN 2000 ML/HR: 10 INJECTION, SOLUTION INTRAMUSCULAR; INTRAVENOUS at 07:15

## 2020-07-29 RX ADMIN — OXYTOCIN 125 ML/HR: 10 INJECTION, SOLUTION INTRAMUSCULAR; INTRAVENOUS at 08:46

## 2020-07-29 RX ADMIN — ROPIVACAINE HYDROCHLORIDE: 2 INJECTION, SOLUTION EPIDURAL; INFILTRATION at 05:39

## 2020-07-29 RX ADMIN — IBUPROFEN 600 MG: 600 TABLET ORAL at 08:43

## 2020-07-29 RX ADMIN — IBUPROFEN 600 MG: 600 TABLET ORAL at 15:44

## 2020-07-29 ASSESSMENT — EDINBURGH POSTNATAL DEPRESSION SCALE (EPDS)
I HAVE BEEN ABLE TO LAUGH AND SEE THE FUNNY SIDE OF THINGS: AS MUCH AS I ALWAYS COULD
I HAVE BEEN SO UNHAPPY THAT I HAVE BEEN CRYING: ONLY OCCASIONALLY
THE THOUGHT OF HARMING MYSELF HAS OCCURRED TO ME: NEVER
THINGS HAVE BEEN GETTING ON TOP OF ME: NO, MOST OF THE TIME I HAVE COPED QUITE WELL
I HAVE BLAMED MYSELF UNNECESSARILY WHEN THINGS WENT WRONG: NOT VERY OFTEN
I HAVE BEEN SO UNHAPPY THAT I HAVE HAD DIFFICULTY SLEEPING: NOT AT ALL
I HAVE FELT SAD OR MISERABLE: NO, NOT AT ALL
I HAVE LOOKED FORWARD WITH ENJOYMENT TO THINGS: AS MUCH AS I EVER DID
I HAVE FELT SCARED OR PANICKY FOR NO GOOD REASON: NO, NOT MUCH
I HAVE BEEN ANXIOUS OR WORRIED FOR NO GOOD REASON: NO, NOT AT ALL

## 2020-07-29 ASSESSMENT — PAIN SCALES - GENERAL: PAIN_LEVEL: 4

## 2020-07-29 NOTE — PROCEDURES
Delivery Note    PATIENT ID:  NAME:  Ann Salguero  MRN:               [id number]  YOB: 1990    Labor Course  Patient was admitted to Labor and Delivery at 39w2d for induction of labor due to gestational diabetes. Pt progressed with cervical ripening and pitocin.  GBS positive and received PCN prior to delivery.    On 2020  at 07:01, this 30 y.o.,  39w2d now   , GBS positive female delivered via OA/ under epidural anesthesia a viable female infant weight pending with APGAR scores of 8 and 9 at one and five minutes. Tight nuchal noted, somersaulted and delivered thru.  Nuchal hand also noted. Baby to maternal abdomen.  Spontaneous cry.  Mouth and nares bulb suctioned by RN. Delayed cord clamping occurred with cord doubly clamped by myself and cut by FOB after pulsations had stopped.  Pitocin infusing in IVF.  Spontaneous delivery of Ortega placenta grossly intact @ 07:05.  CVx3. FF and bleeding small.  Upon vaginal exam, there was no laceration. Pt and infant are stable and bonding.  Lap count: correct x 2 with Tameka Carnes RN.  Estimated blood loss: 300mL.      Magalis Frausto, GILLIAN, APN  Dr. Richardson, attending physician

## 2020-07-29 NOTE — ANESTHESIA PREPROCEDURE EVALUATION
31 yo  39+1wk anne in distress from labor    Relevant Problems   ANESTHESIA (within normal limits)      PULMONARY   (+) Asthma affecting pregnancy in third trimester      OB   (+) Gestational diabetes mellitus (GDM), antepartum   (+) History of  section      Other   (+) Obesity       Physical Exam    Airway   Mallampati: II  TM distance: >3 FB  Neck ROM: full       Cardiovascular    Dental - normal exam           Pulmonary    Abdominal    Neurological - normal exam                 Anesthesia Plan    ASA 2       Plan - epidural   Neuraxial block will be labor analgesia              Pertinent diagnostic labs and testing reviewed    Informed Consent:    Anesthetic plan and risks discussed with patient.

## 2020-07-29 NOTE — PROGRESS NOTES
1900-Report from ALECIA Carl RN. POC discussed. Pt denies needs at this time  1918-JUAN PABLO Frausto CNM at bedside, attempted to place IUPC but station still too high, SVE per provider as charted  1958-Pt requesting epidural, IVF bolus started, consent signed, anesthesia aware  2021-Dr. Tate at bedside, epidural placed, pt tolerated well  2129-JUAN PABLO Frausto CNM at bedside, AROM, clear fluid, IUPC & FSE applied  2340-RN at bedside to reposition pt, pt reporting feeling pressure, SVE as charted  0225-RN at bedside to reposition pt, pt reporting feeling pressure, SVE as charted  0337-RN at bedside, FSE fell off, EFM applied, SVE as charted  0415-Updated JUAN PABLO Frausto CNM in department on pt status  0527-Pt reporting increased pressure, SVE as charted  0550-SVE C/0, prepped pt to begin pushing, updated JUAN PABLO Frausto CNM  0553-Began pushing  0630-Pt pushing, making slow progress  0645-JUAN PABLO Frausto CNM at bedside to push with pt  0700-Report given to ALECIA Carnes RN. POC discussed

## 2020-07-29 NOTE — PROGRESS NOTES
S: Pt is sleeping comfortably with the epidural.    O:    Vitals:    07/29/20 0304 07/29/20 0324 07/29/20 0345 07/29/20 0404   BP: 125/65 (!) 91/50 106/54 (!) 94/55   Pulse: 85 91 93 84   Resp:       Temp:       TempSrc:       SpO2:       Weight:       Height:               FHTs:  Baseline 145, + accels, - decels, mod variability        [place]: 2 contractions in 10 minutes, firm to palpation, pitocin @ 17 milliunits        SVE: 6-7/100/-2 per RN exam     A/P:    1.  IUP @ 39w2d   2.  Cat 1 FHTs    3.  TOLAC/IOL for GDMA1    4.  GBS pos - cont PCN  5.  Peanut ball in place, frequent position changes.  6.  Continue pitocin.    Magalis Frausto, CNM, APN  Dr. Richardson -- attending physician

## 2020-07-29 NOTE — PROGRESS NOTES
S: Pt is feeling more pain with UCs, considering her epidural.    O:    Vitals:    07/28/20 1622 07/28/20 1722 07/28/20 1824 07/28/20 1854   BP: 130/82 123/75 159/75 132/74   Pulse: 82 77 74 85   Resp:       Temp:    37.1 °C (98.7 °F)   TempSrc:    Temporal   Weight:       Height:               FHTs:  Baseline 135, + accels, - decels, mod variability        [place]: 2 contractions in 10 minutes, mild to palpation, pitocin @ 5 milliunits        SVE: 5/100/-3     A/P:    1.  IUP @ 39w1d   2.  Cat 1 FHTs    3.  IOL for GDMA1    4.  Hx of LTCS x 1 for twins  5.  Pain management prn. Pt may have epidural PRN.  6.  Will AROM when comfortable and lower station.  7.  GBS pos - continue PCN.    Magalis Frausto, CNM, APN  Dr. Richardson -- attending physician

## 2020-07-29 NOTE — ANESTHESIA POSTPROCEDURE EVALUATION
Patient: Ann Salguero    Procedure Summary     Date:  07/28/20 Room / Location:      Anesthesia Start:  2021 Anesthesia Stop:  07/29/20 0701    Procedure:  Labor Epidural Diagnosis:      Scheduled Providers:   Responsible Provider:  Rosana Tate M.D.    Anesthesia Type:  epidural ASA Status:  2          Final Anesthesia Type: epidural  Last vitals  BP   Blood Pressure: 122/77    Temp   37.1 °C (98.7 °F)    Pulse   Pulse: 88   Resp   18    SpO2   98 %      Anesthesia Post Evaluation    Patient location during evaluation: bedside  Patient participation: complete - patient participated  Level of consciousness: awake  Pain score: 4    Airway patency: patent  Anesthetic complications: no  Cardiovascular status: adequate  Respiratory status: acceptable  Hydration status: acceptable               Nurse Pain Score: 4 (NPRS)

## 2020-07-29 NOTE — ANESTHESIA PROCEDURE NOTES
Epidural Block    Date/Time: 7/28/2020 8:23 PM  Performed by: Rosana Tate M.D.  Authorized by: Rosana Tate M.D.     Patient Location:  OB  Start Time:  7/28/2020 8:23 PM  End Time:  7/28/2020 8:35 PM  Reason for Block: labor analgesia    patient identified, IV checked, site marked, risks and benefits discussed, surgical consent, monitors and equipment checked, pre-op evaluation and timeout performed    Patient Position:  Sitting  Prep: ChloraPrep, patient draped and sterile technique    Monitoring:  Blood pressure, continuous pulse oximetry and heart rate  Approach:  Midline  Location:  L3-L4  Injection Technique:  CALIRE saline  Skin infiltration:  Lidocaine  Strength:  1%  Dose:  3ml  Needle Type:  Tuohy  Needle Gauge:  17 G  Needle Length:  3.5 in  Loss of resistance::  7  Catheter Size:  19 G  Catheter at Skin Depth:  11  Test Dose Result:  Negative   Initial attempt at L2-3 unsuccessful.  Patient more comfortable after epidural

## 2020-07-29 NOTE — ANESTHESIA TIME REPORT
Anesthesia Start and Stop Event Times     Date Time Event    7/28/2020 2021 Ready for Procedure     2021 Anesthesia Start    7/29/2020 0701 Anesthesia Stop        Responsible Staff  07/28/20 to 07/29/20    Name Role Begin End    Rosana Tate M.D. Anesth 2021 0701        Preop Diagnosis (Free Text):  Pre-op Diagnosis             Preop Diagnosis (Codes):    Post op Diagnosis  Distress from pain in labor      Premium Reason  A. 3PM - 7AM    Comments:

## 2020-07-29 NOTE — PROGRESS NOTES
S: Pt is comfortable c epidural.    O:    Vitals:    07/28/20 2050 07/28/20 2054 07/28/20 2059 07/28/20 2104   BP: 117/65 120/67 125/69 121/69   Pulse: 89 92 87 88   Resp:       Temp:       TempSrc:       SpO2:       Weight:       Height:               FHTs:  Baseline 140, + accels, - decels, mod variability        [place]: 2 contractions in 10 minutes, mod to palpation, pitocin @ 7 milliunits        SVE: 5/100/-2         AROM for clear fluid, IUPC and FSE placed.    A/P:    1.  IUP @ 39w1d   2.  Cat 1 FHTs    3.  TOLAC/IOL for GDMA1    4.  Frequent position changes.  5.  Titrate pitocin per protocol, no higher than 20 milliunits.  6.  GBS pos - continue PCN.    Magalis Frausto, CNM, APN  Dr. Richardson -- attending physician

## 2020-07-29 NOTE — CARE PLAN
Problem: Pain  Goal: Alleviation of Pain or a reduction in pain to the patient's comfort goal  Outcome: PROGRESSING AS EXPECTED  Note: Pain POC discussed. Pt tolerating and comfortable through UC's at this time. Will call out when intervention needed     Problem: Risk for Infection, Impaired Wound Healing  Goal: Remain free from signs and symptoms of infection  Outcome: PROGRESSING AS EXPECTED  Note: Pt afebrile, no s/s of infection

## 2020-07-29 NOTE — LACTATION NOTE
Met with MOB for a lactation visit.  SUSAN delivered her fourth baby today at 0701 at 39.2 weeks gestation. Risk factors for breastfeeding are: GDM and increased BMI of 40.43.  MOB stated the longest she breast fed for was 5 months.  MOB denied having a history of low milk supply.  Lactation assistance was offered, but MOB declined.  MOB stated infant has latched onto the breast without difficulty and fed well.  Infant has been supplemented with formula due to low blood sugar.  MOB denied pain and tissue damage to her nipples and areolas with latch.    SUSAN stated she has WIC and is seen at the office on [address] in [place], [place].  She was informed of the lactation assistance available to her through WIC, the [facility], and the [facility].    SUSAN was encouraged to call for lactation support as needed.

## 2020-07-29 NOTE — PROGRESS NOTES
0700 Report received, pt care assumed.  701  viable female  0715 Placenta delivered, pitocin bolus started.   745 Epidural cath removed, blue tip intact.   0845 Pt up to bathroom, +void, pt taught to use soy bottle, demonstrated understanding. Soy care done, new soy pad and gown to pt.   900 Pt transferred to PP room 314 via wheelchair and assisted to PP bed. Report to PP RN.

## 2020-07-29 NOTE — PROGRESS NOTES
Patient transferred from labor and delivery to room 314. Patient and family oriented to room and postpartum routine. Assessment done. Patient has no c/o pain. Lochia light  Fundus firm. IV site without redness, swelling or drainage.2nd bag pitocin placed on pump at 125/hr.education information sheet reviewed with patient. Safety and security reviewed.baby placed skin to skin.

## 2020-07-30 PROBLEM — O34.219 VBAC, DELIVERED: Status: ACTIVE | Noted: 2020-07-30

## 2020-07-30 PROCEDURE — 700102 HCHG RX REV CODE 250 W/ 637 OVERRIDE(OP): Performed by: OBSTETRICS & GYNECOLOGY

## 2020-07-30 PROCEDURE — A9270 NON-COVERED ITEM OR SERVICE: HCPCS | Performed by: NURSE PRACTITIONER

## 2020-07-30 PROCEDURE — 700112 HCHG RX REV CODE 229: Performed by: NURSE PRACTITIONER

## 2020-07-30 PROCEDURE — 700102 HCHG RX REV CODE 250 W/ 637 OVERRIDE(OP): Performed by: NURSE PRACTITIONER

## 2020-07-30 PROCEDURE — 90707 MMR VACCINE SC: CPT | Performed by: NURSE PRACTITIONER

## 2020-07-30 PROCEDURE — A9270 NON-COVERED ITEM OR SERVICE: HCPCS | Performed by: OBSTETRICS & GYNECOLOGY

## 2020-07-30 PROCEDURE — 770002 HCHG ROOM/CARE - OB PRIVATE (112)

## 2020-07-30 PROCEDURE — 700111 HCHG RX REV CODE 636 W/ 250 OVERRIDE (IP): Performed by: NURSE PRACTITIONER

## 2020-07-30 PROCEDURE — 90471 IMMUNIZATION ADMIN: CPT

## 2020-07-30 RX ADMIN — DOCUSATE SODIUM 100 MG: 100 CAPSULE, LIQUID FILLED ORAL at 09:11

## 2020-07-30 RX ADMIN — PRENATAL WITH FERROUS FUM AND FOLIC ACID 1 TABLET: 3080; 920; 120; 400; 22; 1.84; 3; 20; 10; 1; 12; 200; 27; 25; 2 TABLET ORAL at 09:11

## 2020-07-30 RX ADMIN — IBUPROFEN 600 MG: 600 TABLET ORAL at 04:53

## 2020-07-30 RX ADMIN — MEASLES, MUMPS, AND RUBELLA VIRUS VACCINE LIVE 0.5 ML: 1000; 12500; 1000 INJECTION, POWDER, LYOPHILIZED, FOR SUSPENSION SUBCUTANEOUS at 18:50

## 2020-07-30 NOTE — CARE PLAN
Problem: Potential for postpartum infection related to presence of episiotomy/vaginal tear and/or uterine contamination  Goal: Patient will be absent from signs and symptoms of infection  Outcome: PROGRESSING AS EXPECTED  Note: Vitals within normal limits,temp stable. Baby feeding well, tone and color good.      Problem: Potential knowledge deficit related to lack of understanding of self and  care  Goal: Patient will demonstrate ability to care for self and infant  Outcome: PROGRESSING AS EXPECTED  Note: Patient demonstrates skills in baby care and self care. Patient able to feed,change diapers and comfort baby. Patient demonstrates good hygiene for self and baby.

## 2020-07-30 NOTE — CARE PLAN
"  Problem: Altered physiologic condition related to immediate post-delivery state and potential for bleeding/hemorrhage  Goal: Patient physiologically stable as evidenced by normal lochia, palpable uterine involution and vital signs within normal limits  Outcome: PROGRESSING AS EXPECTED  Note: VSS and within normal parameters. Fundus palpable and firm, lochia light rubra. Patient able to perform soy care independelty with tolerable pain levels treated with heat packs. Will continue to monitor.       Problem: Alteration in comfort related to episiotomy, vaginal repair and/or after birth pains  Goal: Patient verbalizes acceptable pain level  Outcome: PROGRESSING AS EXPECTED  Note: Pain management discussed with pt. Will notify this RN if PRN pain medication is needed. Heat pack given for back pain and patient states the \"heat helps.\" Will continue to monitor.       "

## 2020-07-30 NOTE — PROGRESS NOTES
"1915 - Received report from KWAKU Hou. Assumed care of pt. Plan of care discussed.    Assessment complete. Fundus firm and palpable, lochia light rubra. Pain management and interventions discussed with pt. Pt. Will notify this RN if PRN pain medication is needed. Heat packs given to patient for back pain; patient states heat \"helps pain.\" Pt. Bonding with infant well. Infant breastfeeding well per pt. Discussed D sticks for the night and for patient to call before feeds. Patient verbalized understanding. All questions and concerns discussed at this time. No further needs. Encouraged pt. To call with needs. Will continue to monitor.   "

## 2020-07-30 NOTE — LACTATION NOTE
This note was copied from a baby's chart.            Mom reports darker and enlarged areolas during pregnancy. Mom denies any breast surgeries, thyroid or fertility issues. Mom has GDM and baby had one low blood sugar however mother continues to offer supplements. Recommended that mom call for assist at breast. Mother states that baby can not latch onto left side as effectively. Mom gave a 20 mls after a short feed on right. Mom reports swallows  When baby breastfeeds.      -Reviewed normal  behavior and feeding patterns. Encouraged to do skin to skin.  -Offer both breast at every feeding and then offer supplement according to Supplemental  Volumes feeding guidelines.  Recommended to view [place] [employer] video website. Hand express onto nipple before and after feedings.  -Demand feed baby 10 or more times in 24 hours. Be aware that periods of cluster feeding are normal. Note rhythmic, effective jaw glide.   Encouraged mom to call for assist  If needed to help latch.

## 2020-07-30 NOTE — PROGRESS NOTES
Obstetrics & Gynecology Post-Delivery Progress Note    Date of Service  2020    30 y.o.  1 s/p Vaginal, Spontaneous   Delivery date: 2020  Breastfeeding: Yes    Events  No events    Subjective  Pain: No  Bleeding: lochia minimal  PO's: taking regular diet  Voiding: without difficulty  Ambulating: yes  Feeding: breastfeeding well    Objective  Temp:  [35.9 °C (96.6 °F)-36.9 °C (98.4 °F)] 36.1 °C (97 °F)  Pulse:  [63-95] 63  Resp:  [17-20] 18  BP: (123-133)/(58-76) 123/76  SpO2:  [91 %-97 %] 97 %    Physical Exam  General: well and resting  Chest/Breasts: nipples intact and breasts soft  Fundus: firm, below umbilicus and nontender  Incision: not applicable, (vaginal delivery)  Perineum: well healing  Extremities: symmetric and no edema    Lab Results   Component Value Date    RBC 4.30 2020    ABOGROUP A 2020    RH POSITIVE 2020     Immunization History   Administered Date(s) Administered   • Tdap Vaccine 2010, 2020       Assessment/Plan  Ann Salguero is a 30 y.o.  postpartum day 1 s/p Vaginal, Spontaneous .    1. Post care: meeting all goals  2. Hemodynamics: stable  3. Pain: controlled  4. PNL:   Lab Results   Component Value Date    RH POSITIVE 2020    RUBELLAIGG 0.99 NON-IMMUNE 2020     5. Method of Feeding: plans to breastfeed  6. Method of Contraception: DepoProvera  7. Vaccinations:   Immunization History   Administered Date(s) Administered   • Tdap Vaccine 2010, 2020     8. Disposition: likely home postpartum day 2 due to GBS positive status    No new Assessment & Plan notes have been filed under this hospital service since the last note was generated.  Service: Obstetrics & Gynecology       VTE prophylaxis: Pt is ambulatory    AMALIA Dewey

## 2020-07-31 VITALS
TEMPERATURE: 98.4 F | OXYGEN SATURATION: 94 % | HEIGHT: 60 IN | HEART RATE: 62 BPM | RESPIRATION RATE: 18 BRPM | DIASTOLIC BLOOD PRESSURE: 73 MMHG | WEIGHT: 207 LBS | BODY MASS INDEX: 40.64 KG/M2 | SYSTOLIC BLOOD PRESSURE: 122 MMHG

## 2020-07-31 RX ORDER — IBUPROFEN 600 MG/1
600 TABLET ORAL EVERY 6 HOURS PRN
Qty: 30 TAB | Status: CANCELLED | OUTPATIENT
Start: 2020-07-31

## 2020-07-31 RX ORDER — FERROUS SULFATE 325(65) MG
325 TABLET ORAL DAILY
Qty: 30 TAB | Refills: 0 | Status: SHIPPED | OUTPATIENT
Start: 2020-07-31 | End: 2021-07-26

## 2020-07-31 RX ORDER — IBUPROFEN 600 MG/1
600 TABLET ORAL EVERY 6 HOURS PRN
Qty: 60 TAB | Refills: 0 | Status: SHIPPED | OUTPATIENT
Start: 2020-07-31 | End: 2021-07-26

## 2020-07-31 RX ORDER — VITAMIN A ACETATE, BETA CAROTENE, ASCORBIC ACID, CHOLECALCIFEROL, .ALPHA.-TOCOPHEROL ACETATE, DL-, THIAMINE MONONITRATE, RIBOFLAVIN, NIACINAMIDE, PYRIDOXINE HYDROCHLORIDE, FOLIC ACID, CYANOCOBALAMIN, CALCIUM CARBONATE, FERROUS FUMARATE, ZINC OXIDE, CUPRIC OXIDE 3080; 12; 120; 400; 1; 1.84; 3; 20; 22; 920; 25; 200; 27; 10; 2 [IU]/1; UG/1; MG/1; [IU]/1; MG/1; MG/1; MG/1; MG/1; MG/1; [IU]/1; MG/1; MG/1; MG/1; MG/1; MG/1
1 TABLET, FILM COATED ORAL
Qty: 30 TAB | Refills: 0 | Status: CANCELLED | OUTPATIENT
Start: 2020-07-31

## 2020-07-31 RX ORDER — ACETAMINOPHEN 500 MG
500 TABLET ORAL EVERY 8 HOURS PRN
Qty: 60 TAB | Refills: 0 | Status: SHIPPED | OUTPATIENT
Start: 2020-07-31 | End: 2021-07-26

## 2020-07-31 RX ORDER — PSEUDOEPHEDRINE HCL 30 MG
100 TABLET ORAL 2 TIMES DAILY PRN
Qty: 60 CAP | Refills: 0 | Status: SHIPPED | OUTPATIENT
Start: 2020-07-31 | End: 2021-07-26

## 2020-07-31 NOTE — DISCHARGE SUMMARY
[facility]  NORMAL SPONTANEOUS VAGINAL DISCHARGE SUMMARY    PATIENT ID:  NAME:  Ann Salguero  MRN:               [id number]  YOB: 1990    DATE OF ADMISSION: 2020    DATE OF DISCHARGE: 2020     ADMITTING DIAGNOSIS:  1. Intrauterine pregnancy at 39w2d.      DISCHARGE DIAGNOSIS:  1. s/p         HOSPITAL COURSE: This is a 30 y.o. year old female admitted at 39w2d who presented with positive contractions, No LOF, No  vaginal bleeding, Normal FM. Pt was 1 cm dilated, 30% effaced and at  -3 station on sterile vaginal exam. Pregnancy was complicated by GDMA1 and GBS + status. The patient had a good labor pattern after admission and proceeded to deliver a viable female infant weighing  6 lbs and 11.1 oz. Infants Apgars scores were 8 and 9 at one and five minutes. The patients postpartum course was uncomplicated and she was discharged home in stable condition on postpartum day #2.    PROCEDURES PERFORMED: Normal spontaneous vaginal delivery    COMPLICATIONS: none    DIET: None    ACTIVITY: No intercourse and nothing inserted into the vagina for 5 weeks.    MEDICATIONS:  No current outpatient medications on file.       - Follow up with [facility] in 5 weeks  - No intercourse and nothing inserted into the vagina for 5 weeks  - F/u in 5-6 weeks.   - Continue taking prenatal   - Take iron supplements daily  - Tylenol and Motrin as needed for pain  - Colace as needed for constipation   - Return precautions: Increase vaginal bleeding, clots, chest pain/SOB, Increase edema especially in one limb, Severe abdominal pain, Fever > 100.4 F.

## 2020-07-31 NOTE — DISCHARGE INSTRUCTIONS
POSTPARTUM DISCHARGE INSTRUCTIONS FOR MOM    YOB: 1990   Age: 30 y.o.               Admit Date: 2020     Discharge Date: 2020  Attending Doctor:  Penelope Richardson, *                  Allergies:  Patient has no known allergies.    Discharged to home by car. Discharged via wheelchair, hospital escort: Yes.  Special equipment needed: Not Applicable  Belongings with: Personal  Be sure to schedule a follow-up appointment with your primary care doctor or any specialists as instructed.     Discharge Plan:   Diet Plan: Discussed  Activity Level: Discussed  Confirmed Follow up Appointment: Patient to Call and Schedule Appointment  Confirmed Symptoms Management: Discussed  Medication Reconciliation Updated: Yes    REASONS TO CALL YOUR OBSTETRICIAN:  1.   Persistent fever or shaking chills (Temperature higher than 100.4)  2.   Heavy bleeding (soaking more than 1 pad per hour); Passing clots  3.   Foul odor from vagina  4.   Mastitis (Breast infection; breast pain, chills, fever, redness)  5.   Urinary pain, burning or frequency  6.   Episiotomy infection  7.   Abdominal incision infection  8.   Severe depression longer than 24 hours    HAND WASHING  · Prior to handling the baby.  · Before breastfeeding or bottle feeding baby.  · After using the bathroom or changing the baby's diaper.    WOUND CARE  Ask your physician for additional care instructions.  In general:    ·  Incision:      · Keep clean and dry.    · Do NOT lift anything heavier than your baby for up to 6 weeks.    · There should not be any opening or pus.      VAGINAL CARE  · Nothing inside vagina for 6 weeks: no sexual intercourse, tampons or douching.  · Bleeding may continue for 2-4 weeks.  Amount may vary.    · Call your physician for heavy bleeding which means soaking more than 1 pad per hour    BIRTH CONTROL  · It is possible to become pregnant at any time after delivery and while breastfeeding.  · Plan to discuss a method  "of birth control with your physician at your follow up visit. visit.    DIET AND ELIMINATION  · Eating more fiber (bran cereal, fruits, and vegetables) and drinking plenty of fluids will help to avoid constipation.  · Urinary frequency after childbirth is normal.    POSTPARTUM BLUES  During the first few days after birth, you may experience a sense of the \"blues\" which may include impatience, irritability or even crying.  These feeling come and go quickly.  However, as many as 1 in 10 women experience emotional symptoms known as postpartum depression.    Postpartum depression:  May start as early as the second or third day after delivery or take several weeks or months to develop.  Symptoms of \"blues\" are present, but are more intense:  Crying spells; loss of appetite; feelings of hopelessness or loss of control; fear of touching the baby; over concern or no concern at all about the baby; little or no concern about your own appearance/caring for yourself; and/or inability to sleep or excessive sleeping.  Contact your physician if you are experiencing any of these symptoms.    Crisis Hotline:  · [place] Crisis Hotline:  7-851-NRGNPKJ  Or 1-426.380.1914  · [place] Crisis Hotline:  1-653.458.6163  Or 680-989-2292    PREVENTING SHAKEN BABY:  If you are angry or stressed, PUT THE BABY IN THE CRIB, step away, take some deep breaths, and wait until you are calm to care for the baby.  DO NOT SHAKE THE BABY.  You are not alone, call a supporter for help.    · Crisis Call Center 24/7 crisis line 551-876-5640 or 1-157.378.5161  · You can also text them, text \"ANSWER\" to 811026    QUIT SMOKING/TOBACCO USE:  I understand the use of any tobacco products increases my chance of suffering from future heart disease and could cause other illnesses which may shorten my life. Quitting the use of tobacco products is the single most important thing I can do to improve my health. For further information on smoking / tobacco cessation call " a Toll Free Quit Line at 1-119.318.2320 (*[employer] [place]) or 1-761.476.1270 (American [employer] Association) or you can access the web based program at www.lungusa.org.    · [place] Tobacco Users Help Line:  (701) 382-8975       Toll Free: 1-641.825.3238  · Quit Tobacco Program [facility] (203)588-0882    DEPRESSION / SUICIDE RISK:  As you are discharged from this [facility], it is important to learn how to keep safe from harming yourself.    Recognize the warning signs:  · Abrupt changes in personality, positive or negative- including increase in energy   · Giving away possessions  · Change in eating patterns- significant weight changes-  positive or negative  · Change in sleeping patterns- unable to sleep or sleeping all the time   · Unwillingness or inability to communicate  · Depression  · Unusual sadness, discouragement and loneliness  · Talk of wanting to die  · Neglect of personal appearance   · Rebelliousness- reckless behavior  · Withdrawal from people/activities they love  · Confusion- inability to concentrate     If you or a loved one observes any of these behaviors or has concerns about self-harm, here's what you can do:  · Talk about it- your feelings and reasons for harming yourself  · Remove any means that you might use to hurt yourself (examples: pills, rope, extension cords, firearm)  · Get professional help from the community (Mental Health, Substance Abuse, psychological counseling)  · Do not be alone:Call your Safe Contact- someone whom you trust who will be there for you.  · Call your local CRISIS HOTLINE 770-1000 or 753-289-8371  · Call your local Children's Mobile Crisis Response Team [place] (002) 073-3230 or www.[employer]  · Call the toll free National Suicide Prevention Hotlines   · National Suicide Prevention Lifeline 735-834-KTXB (5059)  · National Hope Line Network 800-SUICIDE (416-9127)    DISCHARGE SURVEY:  Thank you for choosing  [facility].  We hope we provided you with very good care.  You may be receiving a survey in the mail.  Please fill it out.  Your opinion is valuable to us.    ADDITIONAL EDUCATIONAL MATERIALS GIVEN TO PATIENT:        My signature on this form indicates that:  1.  I have reviewed and understand the above information  2.  My questions regarding this information have been answered to my satisfaction.  3.  I have formulated a plan with my discharge nurse to obtain my prescribed medication for home.

## 2020-07-31 NOTE — PROGRESS NOTES
1900- Received report from CAMILA AMES. Assume care of pt.   2330- Report given to Donna JENSEN RN. POC discussed

## 2020-07-31 NOTE — PROGRESS NOTES
Discharge instructions reviewed with pt. All questions answered. Prescriptions to be delivered by meds to bed.

## 2020-08-01 NOTE — DISCHARGE PLANNING
Medication reconcilliation completed. Medications delivered to patient at bedside. Patient counseled.     Ann SalgueroHolly   Home Medication Instructions [place]IO:[id number]    Printed on:07/31/20 1704   Medication Information                      acetaminophen (TYLENOL) 500 MG Tab  Take 1 Tab by mouth every 8 hours as needed.             docusate sodium 100 MG Cap  Take 100 mg by mouth 2 times a day as needed for Constipation.             ferrous sulfate 325 (65 Fe) MG tablet  Take 1 Tab by mouth every day.             ibuprofen (MOTRIN) 600 MG Tab  Take 1 Tab by mouth every 6 hours as needed (For cramping after delivery; do not give if patient is receiving ketorolac (Toradol)).

## 2020-09-02 ENCOUNTER — POST PARTUM (OUTPATIENT)
Dept: OBGYN | Facility: CLINIC | Age: 30
End: 2020-09-02
Payer: MEDICAID

## 2020-09-02 VITALS — SYSTOLIC BLOOD PRESSURE: 108 MMHG | WEIGHT: 191.5 LBS | DIASTOLIC BLOOD PRESSURE: 68 MMHG | BODY MASS INDEX: 37.4 KG/M2

## 2020-09-02 PROBLEM — O34.219 VBAC, DELIVERED: Status: RESOLVED | Noted: 2020-07-30 | Resolved: 2020-09-02

## 2020-09-02 PROBLEM — R73.09 ABNORMAL GTT (GLUCOSE TOLERANCE TEST): Status: RESOLVED | Noted: 2020-06-19 | Resolved: 2020-09-02

## 2020-09-02 PROBLEM — O24.419 GESTATIONAL DIABETES MELLITUS (GDM), ANTEPARTUM: Status: RESOLVED | Noted: 2020-07-03 | Resolved: 2020-09-02

## 2020-09-02 PROBLEM — O35.BXX0 ECHOGENIC FOCUS OF HEART OF FETUS AFFECTING ANTEPARTUM CARE OF MOTHER: Status: RESOLVED | Noted: 2020-07-08 | Resolved: 2020-09-02

## 2020-09-02 PROBLEM — Z98.891 HISTORY OF CESAREAN SECTION: Status: RESOLVED | Noted: 2020-07-07 | Resolved: 2020-09-02

## 2020-09-02 PROCEDURE — 90050 PR POSTPARTUM VISIT: CPT | Performed by: PHYSICIAN ASSISTANT

## 2020-09-02 ASSESSMENT — EDINBURGH POSTNATAL DEPRESSION SCALE (EPDS)
I HAVE BEEN ABLE TO LAUGH AND SEE THE FUNNY SIDE OF THINGS: AS MUCH AS I ALWAYS COULD
I HAVE BEEN SO UNHAPPY THAT I HAVE BEEN CRYING: NO, NEVER
THINGS HAVE BEEN GETTING ON TOP OF ME: NO, I HAVE BEEN COPING AS WELL AS EVER
I HAVE FELT SAD OR MISERABLE: NO, NOT AT ALL
I HAVE BEEN SO UNHAPPY THAT I HAVE HAD DIFFICULTY SLEEPING: NOT VERY OFTEN
TOTAL SCORE: 3
THE THOUGHT OF HARMING MYSELF HAS OCCURRED TO ME: NEVER
I HAVE LOOKED FORWARD WITH ENJOYMENT TO THINGS: AS MUCH AS I EVER DID
I HAVE BEEN ANXIOUS OR WORRIED FOR NO GOOD REASON: NO, NOT AT ALL
I HAVE FELT SCARED OR PANICKY FOR NO GOOD REASON: NO, NOT AT ALL
I HAVE BLAMED MYSELF UNNECESSARILY WHEN THINGS WENT WRONG: YES, SOME OF THE TIME

## 2020-09-02 ASSESSMENT — ENCOUNTER SYMPTOMS
DEPRESSION: 0
MUSCULOSKELETAL NEGATIVE: 1
EYES NEGATIVE: 1
CONSTITUTIONAL NEGATIVE: 1
RESPIRATORY NEGATIVE: 1
GASTROINTESTINAL NEGATIVE: 1
NEUROLOGICAL NEGATIVE: 1
PSYCHIATRIC NEGATIVE: 1
CARDIOVASCULAR NEGATIVE: 1

## 2020-09-02 ASSESSMENT — FIBROSIS 4 INDEX: FIB4 SCORE: 0.5

## 2020-09-02 NOTE — PROGRESS NOTES
Pt here today for postpartum exam,delivery type  Vaginal 7/29/2020  Currently both breast and bottle feeding.   BCM: IUD  Pt states no complaints.   Good ph: [phone]  Last pap smear   Chaperone offered and not needed.

## 2020-09-02 NOTE — PROGRESS NOTES
Subjective:      Ann Salguero is a 30 y.o. female who presents with Postpartum visit today. Pt has no complaints- denies heavy vaginal bleeding, depression, intercourse, pain or problems with BF. PAP wnl 1/20 - repeat 1/23. BCM desired is IUD, either Evelia, Mirena or Liletta - will get prior auth and have pt RTC 1 wk for placement.    Pt to call for 2hr GTT if she hasnt established care with PCP.           HPI    Review of Systems   Constitutional: Negative.    HENT: Negative.    Eyes: Negative.    Respiratory: Negative.    Cardiovascular: Negative.    Gastrointestinal: Negative.    Genitourinary: Negative.    Musculoskeletal: Negative.    Skin: Negative.    Neurological: Negative.    Endo/Heme/Allergies: Negative.    Psychiatric/Behavioral: Negative.  Negative for depression.   All other systems reviewed and are negative.         Objective:     /68   Wt 86.9 kg (191 lb 8 oz)   BMI 37.40 kg/m²      Physical Exam  Vitals signs reviewed.   Constitutional:       Appearance: She is well-developed.   HENT:      Head: Normocephalic and atraumatic.   Eyes:      Pupils: Pupils are equal, round, and reactive to light.   Neck:      Musculoskeletal: Normal range of motion and neck supple.      Thyroid: No thyromegaly.   Cardiovascular:      Rate and Rhythm: Normal rate and regular rhythm.      Heart sounds: Normal heart sounds.   Pulmonary:      Effort: Pulmonary effort is normal. No respiratory distress.      Breath sounds: Normal breath sounds.   Abdominal:      General: Bowel sounds are normal. There is no distension.      Palpations: Abdomen is soft.      Tenderness: There is no abdominal tenderness.   Genitourinary:     Exam position: Supine.      Labia:         Right: No rash or tenderness.         Left: No rash or tenderness.       Vagina: Normal. No signs of injury and foreign body. No vaginal discharge or erythema.      Cervix: No cervical motion tenderness.      Uterus: Not deviated, not enlarged and not  tender.       Adnexa:         Right: No mass or tenderness.          Left: No mass or tenderness.     Skin:     General: Skin is warm and dry.      Findings: No erythema.   Neurological:      Mental Status: She is alert.      Deep Tendon Reflexes: Reflexes are normal and symmetric.   Psychiatric:         Behavior: Behavior normal.         Thought Content: Thought content normal.                 Assessment/Plan:        1. Postpartum care following vaginal delivery  - PAP wnl 1/20 - repeat 3 years  - Referral placed for Mirena IUD - RTC 1 wk for placement    2. GDM  - needs 2hr GTT testing at 12 months PP (Pt aware and will call if she hasnt established care for lab slip)

## 2020-09-14 ENCOUNTER — GYNECOLOGY VISIT (OUTPATIENT)
Dept: OBGYN | Facility: CLINIC | Age: 30
End: 2020-09-14
Payer: MEDICAID

## 2020-09-14 VITALS — WEIGHT: 194 LBS | BODY MASS INDEX: 37.89 KG/M2

## 2020-09-14 DIAGNOSIS — Z30.014 ENCOUNTER FOR INITIAL PRESCRIPTION OF INTRAUTERINE CONTRACEPTIVE DEVICE (IUD): Primary | ICD-10-CM

## 2020-09-14 LAB
INT CON NEG: NEGATIVE
INT CON POS: POSITIVE
POC URINE PREGNANCY TEST: NEGATIVE

## 2020-09-14 PROCEDURE — 58300 INSERT INTRAUTERINE DEVICE: CPT | Performed by: NURSE PRACTITIONER

## 2020-09-14 PROCEDURE — 81025 URINE PREGNANCY TEST: CPT | Performed by: NURSE PRACTITIONER

## 2020-09-14 ASSESSMENT — FIBROSIS 4 INDEX: FIB4 SCORE: 0.5

## 2020-09-14 ASSESSMENT — PATIENT HEALTH QUESTIONNAIRE - PHQ9: CLINICAL INTERPRETATION OF PHQ2 SCORE: 0

## 2020-09-14 NOTE — NON-PROVIDER
Pt here for Mirena insertion. Pregnancy test was negative. Consent signed. Reminder card given     Pt states no complaints   Good# [phone]  LMP:9/2/2020  Pharmacy confirmed.

## 2020-09-14 NOTE — PROCEDURES
IUD Insertion    Date/Time: 9/14/2020 11:11 AM  Performed by: AMALIA Jacobson  Authorized by: AMALIA Jacobson     Consent:     Consent obtained:  Verbal and written    Consent given by:  Patient    Procedure risks and benefits discussed: yes      Patient questions answered: yes      Patient agrees, verbalizes understanding, and wants to proceed: yes      Educational handouts given: yes      Instructions and paperwork completed: yes    Pre-procedure details:     Negative urine pregnancy test: yes    Procedure:     Pelvic exam performed: yes      Sterile speculum placed in vagina: yes      Cervix visualized: yes      Cervix cleaned and prepped in sterile fashion: yes      Tenaculum applied to cervix: yes      Dilation needed: no      Uterus sounded: yes      Uterus sound depth (cm):  8    IUD inserted with no complications: yes      IUD type:  Mirena    Strings trimmed: yes    Post-procedure:     Patient tolerated procedure well: yes      Patient will follow up after next period: yes        IUD: Mirena is choice:    Today the patient is counseled on the risks of IUD insertion. Specifically discussed were alternative forms of birth control. I also discussed with the patient the risk of infection on insertion, and had asked the patient to remain on pelvic rest for one week following the insertion. We also discussed the risk of IUD expulsion, the risk of uterine perforation and IUD migration. If the IUD does migrate the patient may require a separate procedure such as a laparoscopy to retrieve the migrated IUD. I also discussed the 1% risk of pregnancy with IUD use. I also discussed the side effects of Mirena IUD which can be amenorrhea or dysfunctional uterine bleeding or spotting.  Patient had the opportunity to ask questions regarding insertion, risks and benefits, all questions are answered in their entirety.  Informed consent is signed    Procedure note  Urine pregnancy test is negative, informed  consent was previously signed  The bimanual exam is performed the uterus is noted to be 8 weeks in size and is mid position  A speculum was inserted into the vagina, the cervix was cleansed with Betadine swabs x3  Tenaculum was placed on the anterior lip of the cervix at 11:00 and 1:00   The uterus was sounded to 8 centimeters  The IUD is placed under sterile conditions: no com[lications  The strings trimmed to approximately 3 cm  Tenaculum was removed from the cervix and hemostasis was achieved with pressure only  The patient tolerated the procedure well      Lot: [id number]  Exp: NOV 2022      Patient is asked to followup in 4-6 weeks for IUD check. The patient is asked to remain on pelvic rest for 2-3 days.  Use a backup method for 7 days, condoms. She is asked to return sooner than 4-6 weeks for heavy vaginal bleeding uncontrolled pain or fever

## 2021-03-09 ENCOUNTER — OFFICE VISIT (OUTPATIENT)
Dept: URGENT CARE | Facility: CLINIC | Age: 31
End: 2021-03-09
Payer: MEDICAID

## 2021-03-09 VITALS
HEIGHT: 60 IN | OXYGEN SATURATION: 95 % | DIASTOLIC BLOOD PRESSURE: 70 MMHG | TEMPERATURE: 97.5 F | SYSTOLIC BLOOD PRESSURE: 122 MMHG | HEART RATE: 63 BPM | WEIGHT: 210.6 LBS | RESPIRATION RATE: 18 BRPM | BODY MASS INDEX: 41.35 KG/M2

## 2021-03-09 DIAGNOSIS — J45.31 MILD PERSISTENT ASTHMA WITH ACUTE EXACERBATION: ICD-10-CM

## 2021-03-09 PROCEDURE — 99203 OFFICE O/P NEW LOW 30 MIN: CPT | Performed by: PHYSICIAN ASSISTANT

## 2021-03-09 RX ORDER — ALBUTEROL SULFATE 90 UG/1
2 AEROSOL, METERED RESPIRATORY (INHALATION) EVERY 6 HOURS PRN
Qty: 8.5 G | Refills: 2 | Status: SHIPPED | OUTPATIENT
Start: 2021-03-09 | End: 2021-07-26 | Stop reason: SDUPTHER

## 2021-03-09 RX ORDER — MOMETASONE FUROATE AND FORMOTEROL FUMARATE DIHYDRATE 50; 5 UG/1; UG/1
1 AEROSOL RESPIRATORY (INHALATION) DAILY
Qty: 13 G | Refills: 1 | Status: SHIPPED | OUTPATIENT
Start: 2021-03-09 | End: 2021-08-22

## 2021-03-09 ASSESSMENT — FIBROSIS 4 INDEX: FIB4 SCORE: 0.5

## 2021-03-12 ASSESSMENT — ENCOUNTER SYMPTOMS
FREQUENT THROAT CLEARING: 0
SORE THROAT: 0
CHEST TIGHTNESS: 1
SHORTNESS OF BREATH: 0
SPUTUM PRODUCTION: 0
FEVER: 0
MYALGIAS: 0
WHEEZING: 0
RHINORRHEA: 0
COUGH: 1

## 2021-03-12 NOTE — PROGRESS NOTES
Subjective:      Ann Salguero is a 30 y.o. female who presents with Asthma (In need of refills Inhaler. )            Patient requesting refills of her inhalers.    Asthma  She complains of chest tightness and cough. There is no frequent throat clearing, shortness of breath, sputum production or wheezing. This is a chronic problem. The current episode started more than 1 year ago. The problem occurs constantly. The problem has been waxing and waning. The cough is non-productive. Pertinent negatives include no ear pain, fever, malaise/fatigue, myalgias, nasal congestion, rhinorrhea or sore throat. Her symptoms are alleviated by beta-agonist and steroid inhaler. She reports significant improvement on treatment. Her past medical history is significant for asthma.       PMH:  has a past medical history of ASTHMA and Gestational diabetes mellitus (GDM), antepartum (7/3/2020). She also has no past medical history of Addisons disease ([facility]), Adrenal disorder ([facility]), Anemia, Anxiety, Arrhythmia, Arthritis, Blood transfusion without reported diagnosis, Cancer ([facility]), Cataract, CHF (congestive heart failure) ([facility]), Clotting disorder ([facility]), COPD (chronic obstructive pulmonary disease) ([facility]), Cushings syndrome ([facility]), Depression, Diabetic neuropathy ([facility]), GERD (gastroesophageal reflux disease), Glaucoma, Goiter, Head ache, Heart attack ([facility]), Heart murmur, HIV (human immunodeficiency virus infection) ([facility]), Hyperlipidemia, Hypertension, IBD (inflammatory bowel disease), Kidney disease, Meningitis, Migraine, Muscle disorder, Osteoporosis, Parathyroid disorder ([facility]), Pituitary disease ([facility]), Pulmonary emphysema ([facility]), Seizure ([facility]), Sickle cell disease ([facility]), Stroke ([facility]), Substance abuse ([facility]), Thyroid disease, Tuberculosis, or Urinary tract infection.  MEDS:   Current Outpatient Medications:   •  albuterol 108 (90 Base) MCG/ACT Aero Soln inhalation aerosol, Inhale 2 Puffs every 6 hours as needed for Shortness of Breath., Disp:  8.5 g, Rfl: 2  •  DULERA 50-5 MCG/ACT Aerosol, Take 1 Puff by mouth every day., Disp: 13 g, Rfl: 1  •  docusate sodium 100 MG Cap, Take 100 mg by mouth 2 times a day as needed for Constipation., Disp: 60 Cap, Rfl: 0  •  ibuprofen (MOTRIN) 600 MG Tab, Take 1 Tab by mouth every 6 hours as needed (For cramping after delivery; do not give if patient is receiving ketorolac (Toradol))., Disp: 60 Tab, Rfl: 0  •  acetaminophen (TYLENOL) 500 MG Tab, Take 1 Tab by mouth every 8 hours as needed., Disp: 60 Tab, Rfl: 0  •  ferrous sulfate 325 (65 Fe) MG tablet, Take 1 Tab by mouth every day., Disp: 30 Tab, Rfl: 0  •  Blood Glucose Meter Kit, Test blood sugar as recommended by provider. Per patient formulary blood glucose monitoring kit., Disp: 1 Kit, Rfl: 0  •  Blood Glucose Test Strips, Use one  strip to test blood sugar three times daily. Per patient formulary, Disp: 100 Strip, Rfl: 0  •  Lancets, Use one  lancet to test blood sugar three times daily, Disp: 100 Each, Rfl: 0  •  Alcohol Swabs, Wipe site with prep pad prior to injection., Disp: 100 Each, Rfl: 0  •  Prenatal Vit-Fe Fumarate-FA (PRENATAL 1+1 PO), Take  by mouth., Disp: , Rfl:   ALLERGIES:   Allergies   Allergen Reactions   • Pcn [Penicillins] Rash     Pt states they get a rash      SURGHX:   Past Surgical History:   Procedure Laterality Date   • PRIMARY C SECTION  12/29/2010    Performed by JEFFRY SALVADOR at [facility]     SOCHX:  reports that she has never smoked. She has never used smokeless tobacco. She reports that she does not drink alcohol and does not use drugs.  FH: family history includes Diabetes in her maternal grandfather, maternal grandmother, mother, and paternal grandfather; Heart Disease in her mother; Hyperlipidemia in her maternal grandfather and maternal grandmother; Hypertension in her father, maternal grandfather, maternal grandmother, and mother; Lung Disease in her mother; No Known Problems in her sister; Stroke in her  mother.    Review of Systems   Constitutional: Negative for fever and malaise/fatigue.   HENT: Negative for ear pain, rhinorrhea and sore throat.    Respiratory: Positive for cough. Negative for sputum production, shortness of breath and wheezing.    Musculoskeletal: Negative for myalgias.       Medications, Allergies, and current problem list reviewed today in Epic     Objective:     /70 (BP Location: Left arm, Patient Position: Sitting, BP Cuff Size: Adult)   Pulse 63   Temp 36.4 °C (97.5 °F) (Temporal)   Resp 18   Ht 1.524 m (5')   Wt 95.5 kg (210 lb 9.6 oz)   SpO2 95%   BMI 41.13 kg/m²      Physical Exam  Vitals and nursing note reviewed.   Constitutional:       General: She is not in acute distress.     Appearance: She is well-developed. She is not diaphoretic.   HENT:      Head: Normocephalic and atraumatic.      Right Ear: Tympanic membrane, ear canal and external ear normal.      Left Ear: Tympanic membrane, ear canal and external ear normal.      Nose: Nose normal.      Mouth/Throat:      Mouth: Mucous membranes are moist.      Pharynx: No oropharyngeal exudate or posterior oropharyngeal erythema.   Eyes:      Conjunctiva/sclera: Conjunctivae normal.   Cardiovascular:      Rate and Rhythm: Normal rate and regular rhythm.      Heart sounds: Normal heart sounds.   Pulmonary:      Effort: Pulmonary effort is normal. No respiratory distress.      Breath sounds: Normal breath sounds. No stridor. No wheezing, rhonchi or rales.   Musculoskeletal:      Cervical back: Normal range of motion and neck supple.      Right lower leg: No edema.      Left lower leg: No edema.   Skin:     General: Skin is warm and dry.   Neurological:      Mental Status: She is alert and oriented to person, place, and time.   Psychiatric:         Behavior: Behavior normal.         Thought Content: Thought content normal.         Judgment: Judgment normal.                 Assessment/Plan:         1. Mild persistent asthma with  acute exacerbation  albuterol 108 (90 Base) MCG/ACT Aero Soln inhalation aerosol    DULERA 50-5 MCG/ACT Aerosol     PO2 adequate, lungs clear bilaterally wheezes rhonchi or rales.  No infectious symptoms.  Refill of inhalers provided.  OTC meds and conservative measures as discussed    Return to clinic or go to ED if symptoms worsen or persist. Indications for ED discussed at length. Patient/Parent/Guardian voices understanding. Follow-up with your primary care provider in 3-5 days. Red flag symptoms discussed. All side effects of medication discussed including allergic response, GI upset, tendon injury, rash, sedation etc.    Please note that this dictation was created using voice recognition software. I have made every reasonable attempt to correct obvious errors, but I expect that there are errors of grammar and possibly content that I did not discover before finalizing the note.

## 2021-07-26 ENCOUNTER — OFFICE VISIT (OUTPATIENT)
Dept: URGENT CARE | Facility: CLINIC | Age: 31
End: 2021-07-26
Payer: MEDICAID

## 2021-07-26 VITALS
WEIGHT: 221 LBS | OXYGEN SATURATION: 99 % | HEIGHT: 60 IN | HEART RATE: 92 BPM | RESPIRATION RATE: 16 BRPM | BODY MASS INDEX: 43.39 KG/M2 | TEMPERATURE: 98.6 F | DIASTOLIC BLOOD PRESSURE: 60 MMHG | SYSTOLIC BLOOD PRESSURE: 110 MMHG

## 2021-07-26 DIAGNOSIS — J45.31 MILD PERSISTENT ASTHMA WITH ACUTE EXACERBATION: ICD-10-CM

## 2021-07-26 PROCEDURE — 99214 OFFICE O/P EST MOD 30 MIN: CPT | Performed by: NURSE PRACTITIONER

## 2021-07-26 RX ORDER — PREDNISONE 10 MG/1
20 TABLET ORAL 2 TIMES DAILY
Qty: 20 TABLET | Refills: 0 | Status: SHIPPED | OUTPATIENT
Start: 2021-07-26 | End: 2021-07-31

## 2021-07-26 RX ORDER — ALBUTEROL SULFATE 90 UG/1
2 AEROSOL, METERED RESPIRATORY (INHALATION) EVERY 6 HOURS PRN
Qty: 8.5 G | Refills: 2 | Status: SHIPPED | OUTPATIENT
Start: 2021-07-26 | End: 2021-08-22 | Stop reason: SDUPTHER

## 2021-07-26 ASSESSMENT — FIBROSIS 4 INDEX: FIB4 SCORE: 0.52
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
## 2021-08-22 ENCOUNTER — OFFICE VISIT (OUTPATIENT)
Dept: URGENT CARE | Facility: CLINIC | Age: 31
End: 2021-08-22
Payer: MEDICAID

## 2021-08-22 VITALS
RESPIRATION RATE: 20 BRPM | SYSTOLIC BLOOD PRESSURE: 120 MMHG | TEMPERATURE: 98.1 F | OXYGEN SATURATION: 98 % | BODY MASS INDEX: 43.39 KG/M2 | HEIGHT: 60 IN | WEIGHT: 221 LBS | HEART RATE: 84 BPM | DIASTOLIC BLOOD PRESSURE: 80 MMHG

## 2021-08-22 DIAGNOSIS — J45.30 MILD PERSISTENT ASTHMA WITHOUT COMPLICATION: ICD-10-CM

## 2021-08-22 PROCEDURE — 99214 OFFICE O/P EST MOD 30 MIN: CPT | Performed by: FAMILY MEDICINE

## 2021-08-22 RX ORDER — MONTELUKAST SODIUM 10 MG/1
10 TABLET ORAL DAILY
Qty: 30 TABLET | Refills: 2 | Status: SHIPPED | OUTPATIENT
Start: 2021-08-22 | End: 2022-02-04

## 2021-08-22 RX ORDER — ALBUTEROL SULFATE 90 UG/1
2 AEROSOL, METERED RESPIRATORY (INHALATION) EVERY 6 HOURS PRN
Qty: 8.5 G | Refills: 2 | Status: SHIPPED | OUTPATIENT
Start: 2021-08-22 | End: 2022-02-03 | Stop reason: SDUPTHER

## 2021-08-22 ASSESSMENT — ENCOUNTER SYMPTOMS
COUGH: 0
SORE THROAT: 0
FEVER: 0
VOMITING: 0

## 2021-08-22 ASSESSMENT — FIBROSIS 4 INDEX: FIB4 SCORE: 0.52
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
## 2022-02-03 ENCOUNTER — OFFICE VISIT (OUTPATIENT)
Dept: URGENT CARE | Facility: CLINIC | Age: 32
End: 2022-02-03
Payer: COMMERCIAL

## 2022-02-03 VITALS
WEIGHT: 236.8 LBS | TEMPERATURE: 97.3 F | BODY MASS INDEX: 46.49 KG/M2 | HEIGHT: 60 IN | HEART RATE: 74 BPM | DIASTOLIC BLOOD PRESSURE: 80 MMHG | SYSTOLIC BLOOD PRESSURE: 104 MMHG | RESPIRATION RATE: 16 BRPM | OXYGEN SATURATION: 95 %

## 2022-02-03 DIAGNOSIS — J45.30 MILD PERSISTENT ASTHMA WITHOUT COMPLICATION: ICD-10-CM

## 2022-02-03 PROCEDURE — 99214 OFFICE O/P EST MOD 30 MIN: CPT | Performed by: NURSE PRACTITIONER

## 2022-02-03 RX ORDER — ALBUTEROL SULFATE 90 UG/1
2 AEROSOL, METERED RESPIRATORY (INHALATION) EVERY 6 HOURS PRN
Qty: 8.5 G | Refills: 1 | Status: SHIPPED | OUTPATIENT
Start: 2022-02-03 | End: 2022-04-11 | Stop reason: SDUPTHER

## 2022-02-03 ASSESSMENT — FIBROSIS 4 INDEX: FIB4 SCORE: 0.52

## 2022-02-04 ASSESSMENT — ENCOUNTER SYMPTOMS
SHORTNESS OF BREATH: 1
HEMOPTYSIS: 0
FEVER: 0
EYE PAIN: 0
FREQUENT THROAT CLEARING: 0
MYALGIAS: 0
NAUSEA: 0
CHEST TIGHTNESS: 1
VOMITING: 0
DIZZINESS: 0
TROUBLE SWALLOWING: 0
RHINORRHEA: 0
COUGH: 0
CHILLS: 0
HOARSE VOICE: 0
WHEEZING: 1
SORE THROAT: 0

## 2022-02-05 NOTE — PROGRESS NOTES
Subjective:   Ann Salguero is a 31 y.o. female who presents for Asthma (refill on inhaler )      Asthma  She complains of chest tightness, shortness of breath and wheezing. There is no cough, frequent throat clearing, hemoptysis or hoarse voice. This is a new problem. The current episode started in the past 7 days. The problem occurs constantly. The problem has been unchanged. Pertinent negatives include no chest pain, ear congestion, ear pain, fever, myalgias, nasal congestion, postnasal drip, rhinorrhea, sneezing, sore throat or trouble swallowing. Her symptoms are aggravated by nothing. Her symptoms are alleviated by beta-agonist. She reports moderate improvement on treatment. There are no known risk factors for lung disease. Her past medical history is significant for asthma. Past medical history comments: Needs refill of inhaler  .       Review of Systems   Constitutional: Negative for chills and fever.   HENT: Negative for ear pain, hoarse voice, postnasal drip, rhinorrhea, sneezing, sore throat and trouble swallowing.    Eyes: Negative for pain.   Respiratory: Positive for shortness of breath and wheezing. Negative for cough and hemoptysis.    Cardiovascular: Negative for chest pain.   Gastrointestinal: Negative for nausea and vomiting.   Genitourinary: Negative for hematuria.   Musculoskeletal: Negative for myalgias.   Skin: Negative for rash.   Neurological: Negative for dizziness.       Medications:    • albuterol Aers  • beclomethasone HFA    Allergies: Pcn [penicillins]    Problem List: Ann Salguero does not have any pertinent problems on file.    Surgical History:  Past Surgical History:   Procedure Laterality Date   • PRIMARY C SECTION  12/29/2010    Performed by JEFFRY SALVADOR at [facility]       Past Social Hx: Ann Salguero  reports that she has never smoked. She has never used smokeless tobacco. She reports that she does not drink alcohol and does not use drugs.      Past Family Hx:  Ann Salguero family history includes Diabetes in her maternal grandfather, maternal grandmother, mother, and paternal grandfather; Heart Disease in her mother; Hyperlipidemia in her maternal grandfather and maternal grandmother; Hypertension in her father, maternal grandfather, maternal grandmother, and mother; Lung Disease in her mother; No Known Problems in her sister; Stroke in her mother.     Problem list, medications, and allergies reviewed by myself today in Epic.     Objective:     /80   Pulse 74   Temp 36.3 °C (97.3 °F) (Temporal)   Resp 16   Ht 1.524 m (5')   Wt 107 kg (236 lb 12.8 oz)   SpO2 95%   BMI 46.25 kg/m²     Physical Exam  Vitals and nursing note reviewed.   Constitutional:       General: She is not in acute distress.     Appearance: She is well-developed.   HENT:      Head: Normocephalic and atraumatic.      Right Ear: Tympanic membrane and external ear normal.      Left Ear: Tympanic membrane and external ear normal.      Nose: Nose normal.      Right Sinus: No maxillary sinus tenderness or frontal sinus tenderness.      Left Sinus: No maxillary sinus tenderness or frontal sinus tenderness.      Mouth/Throat:      Mouth: Mucous membranes are moist.      Pharynx: Uvula midline. No posterior oropharyngeal erythema.      Tonsils: No tonsillar exudate or tonsillar abscesses.   Eyes:      General:         Right eye: No discharge.         Left eye: No discharge.      Conjunctiva/sclera: Conjunctivae normal.   Cardiovascular:      Rate and Rhythm: Normal rate.   Pulmonary:      Effort: Pulmonary effort is normal. No respiratory distress.      Breath sounds: Normal breath sounds.   Abdominal:      General: There is no distension.   Musculoskeletal:         General: Normal range of motion.   Skin:     General: Skin is warm and dry.   Neurological:      General: No focal deficit present.      Mental Status: She is alert and oriented to person, place, and time. Mental  status is at baseline.      Gait: Gait (gait at baseline) normal.   Psychiatric:         Judgment: Judgment normal.         Assessment/Plan:     Diagnosis and associated orders:     1. Mild persistent asthma without complication  albuterol 108 (90 Base) MCG/ACT Aero Soln inhalation aerosol      Comments/MDM:     I personally reviewed prior external notes and prior test results pertinent to today's visit.   Discussed management options, risks and benefits, and alternatives to treatment plan agreed upon.   Red flags discussed and indications to immediately call 911 or present to the Emergency Department.   Supportive care, differential diagnoses, and indications for immediate follow-up discussed with patient.    • Patient expresses understanding and agrees to plan. Patient denies any other questions or concerns.              Please note that this dictation was created using voice recognition software. I have made a reasonable attempt to correct obvious errors, but I expect that there are errors of grammar and possibly content that I did not discover before finalizing the note.    This note was electronically signed by Jf EL.

## 2022-03-07 ENCOUNTER — OFFICE VISIT (OUTPATIENT)
Dept: URGENT CARE | Facility: CLINIC | Age: 32
End: 2022-03-07
Payer: COMMERCIAL

## 2022-03-07 VITALS
WEIGHT: 240.6 LBS | OXYGEN SATURATION: 94 % | DIASTOLIC BLOOD PRESSURE: 80 MMHG | BODY MASS INDEX: 51.9 KG/M2 | TEMPERATURE: 97.4 F | HEART RATE: 102 BPM | SYSTOLIC BLOOD PRESSURE: 138 MMHG | HEIGHT: 57 IN | RESPIRATION RATE: 20 BRPM

## 2022-03-07 DIAGNOSIS — J45.30 MILD PERSISTENT ASTHMA WITHOUT COMPLICATION: ICD-10-CM

## 2022-03-07 DIAGNOSIS — J45.909 UNCOMPLICATED ASTHMA, UNSPECIFIED ASTHMA SEVERITY, UNSPECIFIED WHETHER PERSISTENT: ICD-10-CM

## 2022-03-07 PROCEDURE — 99213 OFFICE O/P EST LOW 20 MIN: CPT | Performed by: PHYSICIAN ASSISTANT

## 2022-03-07 RX ORDER — ALBUTEROL SULFATE 90 UG/1
2 AEROSOL, METERED RESPIRATORY (INHALATION) EVERY 6 HOURS PRN
Qty: 8.5 G | Refills: 1 | Status: SHIPPED | OUTPATIENT
Start: 2022-03-07 | End: 2022-04-11

## 2022-03-07 ASSESSMENT — FIBROSIS 4 INDEX: FIB4 SCORE: 0.52

## 2022-03-08 ASSESSMENT — ENCOUNTER SYMPTOMS
COUGH: 0
PALPITATIONS: 0
SORE THROAT: 0
DIZZINESS: 0
CHILLS: 0
WHEEZING: 0
FEVER: 0
HEADACHES: 0
SHORTNESS OF BREATH: 0

## 2022-03-08 NOTE — PROGRESS NOTES
Subjective:   Ann Salguero is a 31 y.o. female who presents for Asthma (Wants a inhaler refill and patient is feeling is fine)      HPI:  This is a very pleasant 31-year-old female presenting to the clinic with a past medical history significant for asthma.  She states she has been unable to get into her PCP.  She has an appointment scheduled in 2 months.  Recently ran out of her albuterol inhaler.  Not currently experiencing any shortness of breath, difficulty breathing or wheezing.  Used to be on a steroid inhaler for daily maintenance.  She is also out of this currently.  No recent asthma attacks or exacerbations.    Review of Systems   Constitutional: Negative for chills, fever and malaise/fatigue.   HENT: Negative for congestion and sore throat.    Respiratory: Negative for cough, shortness of breath and wheezing.    Cardiovascular: Negative for chest pain and palpitations.   Neurological: Negative for dizziness and headaches.       Medications:    • albuterol Aers  • beclomethasone HFA    Allergies: Pcn [penicillins]    Problem List: Ann Salguero does not have any pertinent problems on file.    Surgical History:  Past Surgical History:   Procedure Laterality Date   • PRIMARY C SECTION  12/29/2010    Performed by JEFFRY SALVADOR at [facility]       Past Social Hx: Ann Salguero  reports that she has never smoked. She has never used smokeless tobacco. She reports that she does not drink alcohol and does not use drugs.     Past Family Hx:  Ann Salguero family history includes Diabetes in her maternal grandfather, maternal grandmother, mother, and paternal grandfather; Heart Disease in her mother; Hyperlipidemia in her maternal grandfather and maternal grandmother; Hypertension in her father, maternal grandfather, maternal grandmother, and mother; Lung Disease in her mother; No Known Problems in her sister; Stroke in her mother.     Problem list, medications, and allergies reviewed  "by myself today in Epic.     Objective:     /80 (BP Location: Left arm, Patient Position: Sitting, BP Cuff Size: Adult)   Pulse (!) 102   Temp 36.3 °C (97.4 °F) (Temporal)   Resp 20   Ht 1.448 m (4' 9\")   Wt 109 kg (240 lb 9.6 oz)   SpO2 94%   BMI 52.07 kg/m²     Physical Exam  Constitutional:       General: She is not in acute distress.     Appearance: Normal appearance. She is not ill-appearing, toxic-appearing or diaphoretic.   HENT:      Head: Normocephalic and atraumatic.   Eyes:      Conjunctiva/sclera: Conjunctivae normal.   Cardiovascular:      Rate and Rhythm: Normal rate and regular rhythm.      Pulses: Normal pulses.      Heart sounds: Normal heart sounds.   Pulmonary:      Effort: Pulmonary effort is normal.      Breath sounds: Normal breath sounds. No wheezing.   Musculoskeletal:      Cervical back: Normal range of motion.   Skin:     General: Skin is warm.   Neurological:      General: No focal deficit present.      Mental Status: She is alert and oriented to person, place, and time. Mental status is at baseline.           Assessment/Plan:     Comments/MDM:     • Patient with a PMH significant for asthma.  In clinic lung sounds clear to auscultation.  No wheezes, rhonchi or rales.  SPO2 94% on room air.  Sent a refill for the patient's Qvar as well as albuterol.  Take as directed.  Keep scheduled follow-up with PCP for future refills/management.  Please call with any questions or concerns.     Diagnosis and associated orders:     1. Uncomplicated asthma, unspecified asthma severity, unspecified whether persistent  albuterol 108 (90 Base) MCG/ACT Aero Soln inhalation aerosol   2. Mild persistent asthma without complication  beclomethasone HFA (QVAR REDIHALER) 40 MCG/ACT inhaler              Differential diagnosis, natural history, supportive care, and indications for immediate follow-up discussed.    I personally reviewed prior external notes and test results pertinent to today's visit. "     Advised the patient to follow-up with the primary care physician for recheck, reevaluation, and consideration of further management.    Please note that this dictation was created using voice recognition software. I have made reasonable attempt to correct obvious errors, but I expect that there are errors of grammar and possibly content that I did not discover before finalizing the note.    This note was electronically signed by MANUEL Hernandez PA-C

## 2022-04-11 ENCOUNTER — OFFICE VISIT (OUTPATIENT)
Dept: URGENT CARE | Facility: CLINIC | Age: 32
End: 2022-04-11
Payer: COMMERCIAL

## 2022-04-11 VITALS
WEIGHT: 240 LBS | BODY MASS INDEX: 47.12 KG/M2 | DIASTOLIC BLOOD PRESSURE: 78 MMHG | SYSTOLIC BLOOD PRESSURE: 116 MMHG | HEIGHT: 60 IN | RESPIRATION RATE: 20 BRPM | OXYGEN SATURATION: 96 % | HEART RATE: 82 BPM | TEMPERATURE: 96.5 F

## 2022-04-11 DIAGNOSIS — J45.30 MILD PERSISTENT ASTHMA WITHOUT COMPLICATION: ICD-10-CM

## 2022-04-11 DIAGNOSIS — Z76.0 MEDICATION REFILL: ICD-10-CM

## 2022-04-11 PROCEDURE — 99213 OFFICE O/P EST LOW 20 MIN: CPT | Performed by: NURSE PRACTITIONER

## 2022-04-11 RX ORDER — ALBUTEROL SULFATE 90 UG/1
2 AEROSOL, METERED RESPIRATORY (INHALATION) EVERY 6 HOURS PRN
Qty: 8.5 G | Refills: 1 | Status: SHIPPED | OUTPATIENT
Start: 2022-04-11 | End: 2022-08-03 | Stop reason: SDUPTHER

## 2022-04-11 ASSESSMENT — ENCOUNTER SYMPTOMS
FEVER: 0
SORE THROAT: 0
SHORTNESS OF BREATH: 0
EYE PAIN: 0
COUGH: 0
NAUSEA: 0
VOMITING: 0
SPUTUM PRODUCTION: 0
DIZZINESS: 0
MYALGIAS: 0
CHILLS: 0

## 2022-04-11 ASSESSMENT — FIBROSIS 4 INDEX: FIB4 SCORE: 0.52

## 2022-04-11 NOTE — LETTER
April 11, 2022         Patient: Ann Salguero   YOB: 1990   Date of Visit: 4/11/2022           To Whom it May Concern:    Ann Salguero was seen in my clinic on 4/11/2022. She may return to work on 4/11/22.    If you have any questions or concerns, please don't hesitate to call.        Sincerely,           MESSI Linares.  Electronically Signed

## 2022-04-11 NOTE — PROGRESS NOTES
Subjective:   Ann Salguero is a 31 y.o. 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  Patient states that when she is at work working around dry eyes does exacerbate her asthma when she is out of her inhalers.  Patient does not want to go to work without her inhalers.  She denies any significant shortness of breath, wheezing, no fevers, chest pain, palpitations.  Denies any nausea, vomiting.  Asthma is well controlled with prescribed inhalers.  Review of Systems   Constitutional: Negative for chills and fever.   HENT: Negative for sore throat.    Eyes: Negative for pain.   Respiratory: Negative for cough, sputum production and shortness of breath.    Cardiovascular: Negative for chest pain.   Gastrointestinal: Negative for nausea and vomiting.   Genitourinary: Negative for hematuria.   Musculoskeletal: Negative for myalgias.   Skin: Negative for rash.   Neurological: Negative for dizziness.       Medications:    • albuterol Aers  • beclomethasone HFA    Allergies: Pcn [penicillins]    Problem List: Ann Salguero does not have any pertinent problems on file.    Surgical History:  Past Surgical History:   Procedure Laterality Date   • PRIMARY C SECTION  12/29/2010    Performed by JEFFRY SALVADOR at [facility]       Past Social Hx: Ann Salguero  reports that she has never smoked. She has never used smokeless tobacco. She reports that she does not drink alcohol and does not use drugs.     Past Family Hx:  Ann Salguero family history includes Diabetes in her maternal grandfather, maternal grandmother, mother, and paternal grandfather; Heart Disease in her mother; Hyperlipidemia in her maternal grandfather and maternal grandmother; Hypertension in her father, maternal grandfather,  maternal grandmother, and mother; Lung Disease in her mother; No Known Problems in her sister; Stroke in her mother.     Problem list, medications, and allergies reviewed by myself today in Epic.     Objective:     /78 (BP Location: Left arm, Patient Position: Sitting, BP Cuff Size: Adult)   Pulse 82   Temp 35.8 °C (96.5 °F) (Temporal)   Resp 20   Ht 1.524 m (5')   Wt 109 kg (240 lb)   SpO2 96%   BMI 46.87 kg/m²     Physical Exam  Vitals and nursing note reviewed.   Constitutional:       General: She is not in acute distress.     Appearance: She is well-developed.   HENT:      Head: Normocephalic and atraumatic.      Right Ear: External ear normal.      Left Ear: External ear normal.      Nose: Nose normal.      Mouth/Throat:      Mouth: Mucous membranes are moist.   Eyes:      Conjunctiva/sclera: Conjunctivae normal.   Cardiovascular:      Rate and Rhythm: Normal rate.   Pulmonary:      Effort: Pulmonary effort is normal. No respiratory distress.      Breath sounds: Normal breath sounds.   Abdominal:      General: There is no distension.   Musculoskeletal:         General: Normal range of motion.   Skin:     General: Skin is warm and dry.   Neurological:      General: No focal deficit present.      Mental Status: She is alert and oriented to person, place, and time. Mental status is at baseline.      Gait: Gait (gait at baseline) normal.   Psychiatric:         Judgment: Judgment normal.         Assessment/Plan:     Diagnosis and associated orders:     1. Mild persistent asthma without complication  beclomethasone HFA (QVAR REDIHALER) 40 MCG/ACT inhaler    albuterol 108 (90 Base) MCG/ACT Aero Soln inhalation aerosol   2. Medication refill        Comments/MDM:     I personally reviewed prior external notes and prior test results pertinent to today's visit.   Discussed management options, risks and benefits, and alternatives to treatment plan agreed upon.   Red flags discussed and indications to  immediately call 911 or present to the Emergency Department.   Supportive care, differential diagnoses, and indications for immediate follow-up discussed with patient.    • Patient expresses understanding and agrees to plan. Patient denies any other questions or concerns.                Please note that this dictation was created using voice recognition software. I have made a reasonable attempt to correct obvious errors, but I expect that there are errors of grammar and possibly content that I did not discover before finalizing the note.    This note was electronically signed by Jf EL.

## 2022-08-03 ENCOUNTER — OFFICE VISIT (OUTPATIENT)
Dept: URGENT CARE | Facility: CLINIC | Age: 32
End: 2022-08-03
Payer: COMMERCIAL

## 2022-08-03 VITALS
DIASTOLIC BLOOD PRESSURE: 60 MMHG | WEIGHT: 235 LBS | RESPIRATION RATE: 28 BRPM | HEIGHT: 57 IN | OXYGEN SATURATION: 97 % | SYSTOLIC BLOOD PRESSURE: 100 MMHG | HEART RATE: 80 BPM | TEMPERATURE: 98.6 F | BODY MASS INDEX: 50.7 KG/M2

## 2022-08-03 DIAGNOSIS — M72.2 PLANTAR FASCIITIS: ICD-10-CM

## 2022-08-03 DIAGNOSIS — J45.30 MILD PERSISTENT ASTHMA WITHOUT COMPLICATION: ICD-10-CM

## 2022-08-03 DIAGNOSIS — Z76.0 MEDICATION REFILL: ICD-10-CM

## 2022-08-03 PROCEDURE — 99214 OFFICE O/P EST MOD 30 MIN: CPT | Performed by: NURSE PRACTITIONER

## 2022-08-03 RX ORDER — ALBUTEROL SULFATE 90 UG/1
2 AEROSOL, METERED RESPIRATORY (INHALATION) EVERY 6 HOURS PRN
Qty: 8.5 G | Refills: 1 | Status: SHIPPED | OUTPATIENT
Start: 2022-08-03

## 2022-08-03 ASSESSMENT — ENCOUNTER SYMPTOMS
FEVER: 0
MYALGIAS: 0
NAUSEA: 0
SORE THROAT: 0
SHORTNESS OF BREATH: 0
EYE REDNESS: 0
CHILLS: 0
VOMITING: 0
DIZZINESS: 0

## 2022-08-03 NOTE — PROGRESS NOTES
Subjective:   Ann Salguero is a 32 y.o. female who presents for Medication Refill (Asthma medication refill, L foot pain without known injury)      HPI  Patient is a 32-year-old female presents urgent care with a request of medication refill of her Qvar and albuterol for her asthma she is not having an exacerbation just is ran out of her medication.  She is a scheduled to see her PCP this coming month however will be out of her medication.  Patient also has been experiencing left foot pain with no known injury.  She does note the pain worsened when she was wearing flat shoes, with no support she has recently changed her shoe attire however having no significant improvement.  She denies numbness or tingling, patient also concerned as she has been not losing weight feels that her weight is attributing to her foot pain.  Review of Systems   Constitutional: Negative for chills and fever.   HENT: Negative for sore throat.    Eyes: Negative for redness.   Respiratory: Negative for shortness of breath.    Cardiovascular: Negative for chest pain.   Gastrointestinal: Negative for nausea and vomiting.   Genitourinary: Negative for dysuria.   Musculoskeletal: Negative for myalgias.        Left foot pain     Skin: Negative for rash.   Neurological: Negative for dizziness.       Medications:    • albuterol Aers  • beclomethasone HFA    Allergies: Pcn [penicillins]    Problem List: Ann Salguero does not have any pertinent problems on file.    Surgical History:  Past Surgical History:   Procedure Laterality Date   • PRIMARY C SECTION  12/29/2010    Performed by JEFFRY SALVADOR at [facility]       Past Social Hx: Ann Salguero  reports that she has never smoked. She has never used smokeless tobacco. She reports that she does not drink alcohol and does not use drugs.     Past Family Hx:  Ann Salguero family history includes Diabetes in her maternal grandfather, maternal grandmother, mother, and  "paternal grandfather; Heart Disease in her mother; Hyperlipidemia in her maternal grandfather and maternal grandmother; Hypertension in her father, maternal grandfather, maternal grandmother, and mother; Lung Disease in her mother; No Known Problems in her sister; Stroke in her mother.     Problem list, medications, and allergies reviewed by myself today in Epic.     Objective:     /60 (BP Location: Left arm, Patient Position: Sitting, BP Cuff Size: Large adult long)   Pulse 80   Temp 37 °C (98.6 °F) (Temporal)   Resp (!) 28   Ht 1.448 m (4' 9\")   Wt 107 kg (235 lb)   SpO2 97%   BMI 50.85 kg/m²     Physical Exam  Constitutional:       Appearance: Normal appearance. She is morbidly obese. She is not ill-appearing or toxic-appearing.   HENT:      Head: Normocephalic.      Right Ear: External ear normal.      Left Ear: External ear normal.      Nose: Nose normal.      Mouth/Throat:      Lips: Pink.      Mouth: Mucous membranes are moist.   Eyes:      General: Lids are normal.         Right eye: No discharge.         Left eye: No discharge.   Pulmonary:      Effort: Pulmonary effort is normal. No accessory muscle usage or respiratory distress.   Musculoskeletal:         General: Normal range of motion.      Cervical back: Full passive range of motion without pain.      Left ankle: Normal.      Left Achilles Tendon: Normal.      Left foot: Normal range of motion and normal capillary refill. Tenderness present. No swelling or bony tenderness. Normal pulse.        Feet:    Skin:     Coloration: Skin is not pale.   Neurological:      Mental Status: She is alert and oriented to person, place, and time.   Psychiatric:         Mood and Affect: Mood normal.         Thought Content: Thought content normal.         Assessment/Plan:     Diagnosis and associated orders:     1. Plantar fasciitis     2. Medication refill     3. Mild persistent asthma without complication  albuterol 108 (90 Base) MCG/ACT Aero Soln " inhalation aerosol    beclomethasone HFA (QVAR REDIHALER) 40 MCG/ACT inhaler      Comments/MDM:     I personally reviewed prior external notes and prior test results pertinent to today's visit.  Patient with no acute trauma or injury to the left foot, on exam consistent with diagnoses encouraged myofascial release, icing, stretching, encouraged to get insoles for shoes and to continue wearing supportive shoes.  Patient will follow-up with her PCP for further medication refill and discussion  Discussed management options, risks and benefits, and alternatives to treatment plan agreed upon.   Red flags discussed and indications to immediately call 911 or present to the Emergency Department.   Supportive care, differential diagnoses, and indications for immediate follow-up discussed with patient.    • Patient expresses understanding and agrees to plan. Patient denies any other questions or concerns.            My total time spent caring for the patient on the day of the encounter was 30 minutes.   This does not include time spent on separately billable procedures/tests.    Please note that this dictation was created using voice recognition software. I have made a reasonable attempt to correct obvious errors, but I expect that there are errors of grammar and possibly content that I did not discover before finalizing the note.    This note was electronically signed by Jf EL.

## 2022-10-29 ENCOUNTER — OFFICE VISIT (OUTPATIENT)
Dept: URGENT CARE | Facility: CLINIC | Age: 32
End: 2022-10-29
Payer: COMMERCIAL

## 2022-10-29 VITALS
HEART RATE: 88 BPM | DIASTOLIC BLOOD PRESSURE: 80 MMHG | WEIGHT: 219.4 LBS | OXYGEN SATURATION: 98 % | TEMPERATURE: 97.2 F | SYSTOLIC BLOOD PRESSURE: 140 MMHG | BODY MASS INDEX: 43.07 KG/M2 | HEIGHT: 60 IN

## 2022-10-29 DIAGNOSIS — J06.9 VIRAL URI WITH COUGH: ICD-10-CM

## 2022-10-29 DIAGNOSIS — U07.1 COVID-19: ICD-10-CM

## 2022-10-29 DIAGNOSIS — Z87.09 HISTORY OF ASTHMA: ICD-10-CM

## 2022-10-29 DIAGNOSIS — J45.901 EXACERBATION OF ASTHMA, UNSPECIFIED ASTHMA SEVERITY, UNSPECIFIED WHETHER PERSISTENT: ICD-10-CM

## 2022-10-29 LAB
EXTERNAL QUALITY CONTROL: ABNORMAL
INT CON NEG: ABNORMAL
INT CON POS: ABNORMAL
SARS-COV+SARS-COV-2 AG RESP QL IA.RAPID: POSITIVE

## 2022-10-29 PROCEDURE — 87426 SARSCOV CORONAVIRUS AG IA: CPT | Performed by: NURSE PRACTITIONER

## 2022-10-29 PROCEDURE — 99214 OFFICE O/P EST MOD 30 MIN: CPT | Mod: CS | Performed by: NURSE PRACTITIONER

## 2022-10-29 RX ORDER — BENZONATATE 200 MG/1
200 CAPSULE ORAL EVERY 8 HOURS PRN
Qty: 30 CAPSULE | Refills: 0 | Status: SHIPPED | OUTPATIENT
Start: 2022-10-29

## 2022-10-29 RX ORDER — ALBUTEROL SULFATE 90 UG/1
1-2 AEROSOL, METERED RESPIRATORY (INHALATION) EVERY 4 HOURS PRN
Qty: 8 G | Refills: 0 | Status: SHIPPED | OUTPATIENT
Start: 2022-10-29

## 2022-10-29 ASSESSMENT — ENCOUNTER SYMPTOMS
HEADACHES: 1
SENSORY CHANGE: 0
DIARRHEA: 1
EYE PAIN: 1
MYALGIAS: 1
WEAKNESS: 0
DIZZINESS: 1
CHILLS: 1
SHORTNESS OF BREATH: 1
COUGH: 1

## 2022-10-29 ASSESSMENT — VISUAL ACUITY: OU: 1

## 2022-10-29 NOTE — LETTER
October 29, 2022         Patient: Ann Salguero   YOB: 1990   Date of Visit: 10/29/2022           To Whom it May Concern:    Ann Salguero was seen in my clinic on 10/29/2022.    Patient had a SARS-CoV-2 rapid antigen test performed on 10/29/2022 to evaluate for COVID-19 and results are POSITIVE. Patient has been ill since 10/28/2022.    Patient is advised to self-isolate as recommended by the CDC.    • Children and adults with mild, symptomatic COVID-19: Isolation can end at least 5 days after symptom onset and after fever ends for 24 hours (without the use of fever-reducing medication) and symptoms are improving, if these people can continue to properly wear a well-fitted mask around others for 5 more days after the 5-day isolation period. Day 0 is the first day of symptoms.  • People who are infected but asymptomatic (never develop symptoms): Isolation can end at least 5 days after the first positive test (with day 0 being the date their specimen was collected for the positive test), if these people can continue to wear a properly well-fitted mask around others for 5 more days after the 5-day isolation period. However, if symptoms develop after a positive test, their 5-day isolation period should start over (day 0 changes to the first day of symptoms).  • People who have moderate COVID-19 illness: Isolate for 10 days.  • People who are severely ill (i.e., requiring hospitalization, intensive care, or ventilation support): Extending the duration of isolation and precautions to at least 10 days and up to 20 days after symptom onset, and after fever ends (without the use of fever-reducing medication) and symptoms are improving, may be warranted.  • People who are moderately or severely immunocompromised might have a longer infectious period: Extend isolation to 20 or more days (day 0 is the first day of symptoms or a positive viral test). Use a test-based strategy and consult with an infectious disease  specialist to determine the appropriate duration of isolation and precautions.  • Recovered patients: Patients who have recovered from COVID-19 can continue to have detectable SARS-CoV-2 RNA in upper respiratory specimens for up to 3 months after illness onset. However, replication-competent virus has not been reliably recovered from such patients, and they are not likely infectious.  • Available data suggest that patients with mild-to-moderate COVID-19 remain infectious no longer than 10 days after symptom onset.     Please visit https://www.cdc.gov/coronavirus/2019-ncov/hcp/duration-isolation.html for further information.     If you have any questions or concerns, please don't hesitate to call.        Sincerely,         RICHARD LandonRSamanthaN.  Electronically Signed

## 2022-10-29 NOTE — PATIENT INSTRUCTIONS
Patient had a SARS-CoV-2 rapid antigen test performed on 10/29/2022 to evaluate for COVID-19 and results are POSITIVE. Patient has been ill since 10/28/2022.    Patient is advised to self-isolate as recommended by the CDC.    Children and adults with mild, symptomatic COVID-19: Isolation can end at least 5 days after symptom onset and after fever ends for 24 hours (without the use of fever-reducing medication) and symptoms are improving, if these people can continue to properly wear a well-fitted mask around others for 5 more days after the 5-day isolation period. Day 0 is the first day of symptoms.  People who are infected but asymptomatic (never develop symptoms): Isolation can end at least 5 days after the first positive test (with day 0 being the date their specimen was collected for the positive test), if these people can continue to wear a properly well-fitted mask around others for 5 more days after the 5-day isolation period. However, if symptoms develop after a positive test, their 5-day isolation period should start over (day 0 changes to the first day of symptoms).  People who have moderate COVID-19 illness: Isolate for 10 days.  People who are severely ill (i.e., requiring hospitalization, intensive care, or ventilation support): Extending the duration of isolation and precautions to at least 10 days and up to 20 days after symptom onset, and after fever ends (without the use of fever-reducing medication) and symptoms are improving, may be warranted.  People who are moderately or severely immunocompromised might have a longer infectious period: Extend isolation to 20 or more days (day 0 is the first day of symptoms or a positive viral test). Use a test-based strategy and consult with an infectious disease specialist to determine the appropriate duration of isolation and precautions.  Recovered patients: Patients who have recovered from COVID-19 can continue to have detectable SARS-CoV-2 RNA in upper  respiratory specimens for up to 3 months after illness onset. However, replication-competent virus has not been reliably recovered from such patients, and they are not likely infectious.  Available data suggest that patients with mild-to-moderate COVID-19 remain infectious no longer than 10 days after symptom onset.     Please visit https://www.cdc.gov/coronavirus/2019-ncov/hcp/duration-isolation.html for further information.

## 2022-10-29 NOTE — PROGRESS NOTES
Subjective:     Ann Salguero is a 32 y.o. female who presents for Pharyngitis (X2days Cough/body aches/diarrhea/chills/home COVID test positive)       Cough  This is a new problem. The current episode started yesterday. The problem has been gradually worsening. Associated symptoms include chills, headaches, myalgias and shortness of breath. She has tried a beta-agonist inhaler for the symptoms. Her past medical history is significant for asthma.     Home Covid test positive.    Hx of asthma. Using Ventolin more often than usual. On Qvar.    Review of Systems   Constitutional:  Positive for chills and malaise/fatigue.   HENT:  Positive for congestion.    Eyes:  Positive for pain.   Respiratory:  Positive for cough and shortness of breath.    Gastrointestinal:  Positive for diarrhea.   Musculoskeletal:  Positive for myalgias.   Neurological:  Positive for dizziness and headaches. Negative for sensory change and weakness.   All other systems reviewed and are negative.    Refer to [facility] for additional details.    During this visit, appropriate PPE was worn, hand hygiene was performed, and the patient and any visitors were masked.    PMH:  has a past medical history of ASTHMA and Gestational diabetes mellitus (GDM), antepartum (7/3/2020).    She has no past medical history of Addisons disease ([facility]), Adrenal disorder ([facility]), Anemia, Anxiety, Arrhythmia, Arthritis, Blood transfusion without reported diagnosis, Cancer ([facility]), Cataract, CHF (congestive heart failure) ([facility]), Clotting disorder ([facility]), COPD (chronic obstructive pulmonary disease) ([facility]), Cushings syndrome ([facility]), Depression, Diabetic neuropathy ([facility]), GERD (gastroesophageal reflux disease), Glaucoma, Goiter, Head ache, Heart attack ([facility]), Heart murmur, HIV (human immunodeficiency virus infection) ([facility]), Hyperlipidemia, Hypertension, IBD (inflammatory bowel disease), Kidney disease, Meningitis, Migraine, Muscle disorder, Osteoporosis, Parathyroid disorder ([facility]),  Pituitary disease (HCC), Pulmonary emphysema (HCC), Seizure (HCC), Sickle cell disease (HCC), Stroke (HCC), Substance abuse (HCC), Thyroid disease, Tuberculosis, or Urinary tract infection.    MEDS:   Current Outpatient Medications:     beclomethasone HFA (QVAR REDIHALER) 40 MCG/ACT inhaler, Inhale 1 Puff 2 times a day., Disp: 1 Each, Rfl: 0    albuterol 108 (90 Base) MCG/ACT Aero Soln inhalation aerosol, Inhale 1-2 Puffs every four hours as needed for Shortness of Breath (and/or wheezing)., Disp: 8 g, Rfl: 0    Nirmatrelvir&Ritonavir 300/100 20 x 150 MG & 10 x 100MG Tablet Therapy Pack, Take 300 mg nirmatrelvir (two 150 mg tablets) with 100 mg ritonavir (one 100 mg tablet) by mouth, with all three tablets taken together twice daily for 5 days., Disp: 30 Each, Rfl: 0    benzonatate (TESSALON) 200 MG capsule, Take 1 Capsule by mouth every 8 hours as needed for Cough., Disp: 30 Capsule, Rfl: 0    albuterol 108 (90 Base) MCG/ACT Aero Soln inhalation aerosol, Inhale 2 Puffs every 6 hours as needed for Shortness of Breath., Disp: 8.5 g, Rfl: 1    ALLERGIES:   Allergies   Allergen Reactions    Pcn [Penicillins] Rash     Pt states they get a rash      SURGHX:   Past Surgical History:   Procedure Laterality Date    PRIMARY C SECTION  12/29/2010    Performed by JEFFRY SALVADOR at [facility]     SOCHX:  reports that she has never smoked. She has never used smokeless tobacco. She reports that she does not drink alcohol and does not use drugs.    FH: Per HPI as applicable/pertinent.      Objective:     BP (!) 140/80 (BP Location: Right arm, Patient Position: Sitting)   Pulse 88   Temp 36.2 °C (97.2 °F) (Temporal)   Ht 1.524 m (5')   Wt 99.5 kg (219 lb 6.4 oz)   LMP 09/20/2022   SpO2 98%   BMI 42.85 kg/m²     Physical Exam  Nursing note reviewed.   Constitutional:       General: She is not in acute distress.     Appearance: She is well-developed. She is not toxic-appearing.   HENT:      Head: Normocephalic.       Right Ear: External ear normal.      Left Ear: External ear normal.      Nose: Congestion and rhinorrhea present.      Mouth/Throat:      Mouth: Mucous membranes are moist.   Eyes:      General: Vision grossly intact.      Extraocular Movements: Extraocular movements intact.      Conjunctiva/sclera: Conjunctivae normal.      Pupils: Pupils are equal, round, and reactive to light.   Cardiovascular:      Rate and Rhythm: Normal rate and regular rhythm.      Heart sounds: Normal heart sounds.   Pulmonary:      Effort: Pulmonary effort is normal. No respiratory distress.      Breath sounds: Normal breath sounds. No decreased breath sounds.   Musculoskeletal:         General: No deformity. Normal range of motion.      Cervical back: Normal range of motion.   Skin:     General: Skin is warm and dry.      Coloration: Skin is not pale.   Neurological:      Mental Status: She is alert and oriented to person, place, and time.      Motor: No weakness.   Psychiatric:         Behavior: Behavior normal. Behavior is cooperative.     POCT Covid: positive      Assessment/Plan:     1. COVID-19  - Nirmatrelvir&Ritonavir 300/100 20 x 150 MG & 10 x 100MG Tablet Therapy Pack; Take 300 mg nirmatrelvir (two 150 mg tablets) with 100 mg ritonavir (one 100 mg tablet) by mouth, with all three tablets taken together twice daily for 5 days.  Dispense: 30 Each; Refill: 0    2. Viral URI with cough  - POCT SARS-COV Antigen DEMIAN Manual Result  - benzonatate (TESSALON) 200 MG capsule; Take 1 Capsule by mouth every 8 hours as needed for Cough.  Dispense: 30 Capsule; Refill: 0    3. Exacerbation of asthma, unspecified asthma severity, unspecified whether persistent  - albuterol 108 (90 Base) MCG/ACT Aero Soln inhalation aerosol; Inhale 1-2 Puffs every four hours as needed for Shortness of Breath (and/or wheezing).  Dispense: 8 g; Refill: 0    4. History of asthma  - beclomethasone HFA (QVAR REDIHALER) 40 MCG/ACT inhaler; Inhale 1 Puff 2 times a  "day.  Dispense: 1 Each; Refill: 0    Date of symptom onset: 10/28/2022    Date of positive COVID test: 10/29/2022    With regard to Paxlovid:  I have provided the patient with the \"fact sheet for patients and parents/caregivers.\"   I informed the patient of therapeutic alternatives to Paxlovid and the risks and benefits of those alternatives.   I informed the patient the option to accept or refuse Paxlovid.   I informed the patient that Paxlovid is not FDA approved, but that it is authorized for use under emergency by the FDA.   I informed the patient of the known risks and benefits of Paxlovid and discussed the extent to which such risks and benefits are unknown.   The patient consents to prescription for Paxlovid.    Checklist reviewed. Medication list reviewed. No contraindications to Paxlovid treatment identified. Rx sent electronically to pharmacy.    Patient is advised to self-isolate as recommended by the CDC.    Differential diagnosis, natural history, supportive care, rest, fluids, over-the-counter symptom management per 's instructions, close monitoring, and indications for immediate follow-up discussed. Rx as above sent electronically. OTC ibuprofen PRN.    Discussed likely self-limiting viral etiology and expected course and duration of illness. Vital signs stable, afebrile, no acute distress at this time. Warning signs reviewed. Return precautions discussed.     All questions answered. Patient agrees with the plan of care.    Discharge summary provided.    Work note provided.    Billing note: chronic illness with exacerbation/progression; prescription drug management   "

## 2023-08-02 RX ORDER — ALBUTEROL SULFATE 90 UG/1
AEROSOL, METERED RESPIRATORY (INHALATION)
Qty: 18 G | Refills: 0 | OUTPATIENT
Start: 2023-08-02

## 2024-04-15 ENCOUNTER — OFFICE VISIT (OUTPATIENT)
Dept: URGENT CARE | Facility: CLINIC | Age: 34
End: 2024-04-15
Payer: COMMERCIAL

## 2024-04-15 VITALS
TEMPERATURE: 97 F | OXYGEN SATURATION: 97 % | HEART RATE: 92 BPM | SYSTOLIC BLOOD PRESSURE: 126 MMHG | RESPIRATION RATE: 20 BRPM | DIASTOLIC BLOOD PRESSURE: 76 MMHG

## 2024-04-15 DIAGNOSIS — J32.9 BACTERIAL SINUSITIS: ICD-10-CM

## 2024-04-15 DIAGNOSIS — J45.41 MODERATE PERSISTENT ASTHMA WITH EXACERBATION: ICD-10-CM

## 2024-04-15 DIAGNOSIS — B96.89 BACTERIAL SINUSITIS: ICD-10-CM

## 2024-04-15 PROCEDURE — 99214 OFFICE O/P EST MOD 30 MIN: CPT | Performed by: FAMILY MEDICINE

## 2024-04-15 PROCEDURE — 3074F SYST BP LT 130 MM HG: CPT | Performed by: FAMILY MEDICINE

## 2024-04-15 PROCEDURE — 3078F DIAST BP <80 MM HG: CPT | Performed by: FAMILY MEDICINE

## 2024-04-15 RX ORDER — DOXYCYCLINE HYCLATE 100 MG
100 TABLET ORAL 2 TIMES DAILY
Qty: 10 TABLET | Refills: 0 | Status: SHIPPED | OUTPATIENT
Start: 2024-04-15 | End: 2024-04-20

## 2024-04-15 RX ORDER — ALBUTEROL SULFATE 90 UG/1
2 AEROSOL, METERED RESPIRATORY (INHALATION) EVERY 6 HOURS PRN
Qty: 8.5 G | Refills: 0 | Status: SHIPPED | OUTPATIENT
Start: 2024-04-15

## 2024-04-15 RX ORDER — METHYLPREDNISOLONE 4 MG/1
TABLET ORAL
Qty: 21 TABLET | Refills: 0 | Status: SHIPPED | OUTPATIENT
Start: 2024-04-15

## 2024-04-15 NOTE — PROGRESS NOTES
Subjective:      33 y.o. female presents to urgent care for cold symptoms that started about one week ago. She is experiencing fever, headache, body aches, sore throat and cough. No diarrhea. No tobacco product use. She does have asthma for which she uses albuterol. Typically she uses her albuterol an average 2-3 times per day, but since getting sick is using it 7 times daily. She is not vaccinated against COVID. No known sick contacts.     She denies any other questions or concerns at this time.    Current problem list, medication, and past medical/surgical history were reviewed in Epic.    ROS  See HPI     Objective:      /76   Pulse 92   Temp 36.1 °C (97 °F)   Resp 20   SpO2 97%     Physical Exam  Constitutional:       General: She is not in acute distress.     Appearance: She is not diaphoretic.   HENT:      Right Ear: Tympanic membrane, ear canal and external ear normal.      Left Ear: Tympanic membrane, ear canal and external ear normal.      Nose:      Right Sinus: Frontal sinus tenderness present. No maxillary sinus tenderness.      Left Sinus: Frontal sinus tenderness present. No maxillary sinus tenderness.      Mouth/Throat:      Tongue: Tongue does not deviate from midline.      Palate: No lesions.      Pharynx: Uvula midline. No oropharyngeal exudate or posterior oropharyngeal erythema.      Tonsils: No tonsillar exudate. 1+ on the right. 1+ on the left.   Cardiovascular:      Rate and Rhythm: Normal rate and regular rhythm.      Heart sounds: Normal heart sounds.   Pulmonary:      Effort: Pulmonary effort is normal. No respiratory distress.      Breath sounds: Normal breath sounds.   Neurological:      Mental Status: She is alert.   Psychiatric:         Mood and Affect: Affect normal.         Judgment: Judgment normal.       Assessment/Plan:     1. Bacterial sinusitis  Criteria for bacterial sinusitis has been met.  She is penicillin allergic, prescription for doxycycline has been sent.   Tylenol and ibuprofen as needed.  - doxycycline (VIBRAMYCIN) 100 MG Tab; Take 1 Tablet by mouth 2 times a day for 5 days.  Dispense: 10 Tablet; Refill: 0    2. Moderate persistent asthma with exacerbation  Acute on chronic issue.  Refill of albuterol has been sent.  Medrol Dosepak has been sent.  - albuterol 108 (90 Base) MCG/ACT Aero Soln inhalation aerosol; Inhale 2 Puffs every 6 hours as needed for Shortness of Breath.  Dispense: 8.5 g; Refill: 0  - methylPREDNISolone (MEDROL DOSEPAK) 4 MG Tablet Therapy Pack; Follow schedule on package instructions.  Dispense: 21 Tablet; Refill: 0      Instructed to return to Urgent Care or nearest Emergency Department if symptoms fail to improve, for any change in condition, further concerns, or new concerning symptoms. Patient states understanding of the plan of care and discharge instructions.    Marion Lacey M.D.

## 2024-05-16 ENCOUNTER — OFFICE VISIT (OUTPATIENT)
Dept: URGENT CARE | Facility: CLINIC | Age: 34
End: 2024-05-16
Payer: COMMERCIAL

## 2024-05-16 VITALS
DIASTOLIC BLOOD PRESSURE: 80 MMHG | TEMPERATURE: 98.4 F | RESPIRATION RATE: 18 BRPM | OXYGEN SATURATION: 96 % | HEIGHT: 60 IN | WEIGHT: 210 LBS | BODY MASS INDEX: 41.23 KG/M2 | SYSTOLIC BLOOD PRESSURE: 138 MMHG | HEART RATE: 104 BPM

## 2024-05-16 DIAGNOSIS — J45.41 MODERATE PERSISTENT ASTHMA WITH EXACERBATION: ICD-10-CM

## 2024-05-16 PROCEDURE — 3075F SYST BP GE 130 - 139MM HG: CPT | Performed by: PHYSICIAN ASSISTANT

## 2024-05-16 PROCEDURE — 3079F DIAST BP 80-89 MM HG: CPT | Performed by: PHYSICIAN ASSISTANT

## 2024-05-16 PROCEDURE — 94640 AIRWAY INHALATION TREATMENT: CPT | Performed by: PHYSICIAN ASSISTANT

## 2024-05-16 PROCEDURE — 99214 OFFICE O/P EST MOD 30 MIN: CPT | Mod: 25 | Performed by: PHYSICIAN ASSISTANT

## 2024-05-16 RX ORDER — FLUTICASONE PROPIONATE AND SALMETEROL 250; 50 UG/1; UG/1
1 POWDER RESPIRATORY (INHALATION) EVERY 12 HOURS
Qty: 60 EACH | Refills: 3 | Status: SHIPPED | OUTPATIENT
Start: 2024-05-16

## 2024-05-16 RX ORDER — IPRATROPIUM BROMIDE AND ALBUTEROL SULFATE 2.5; .5 MG/3ML; MG/3ML
3 SOLUTION RESPIRATORY (INHALATION) ONCE
Status: COMPLETED | OUTPATIENT
Start: 2024-05-16 | End: 2024-05-16

## 2024-05-16 RX ORDER — ALBUTEROL SULFATE 90 UG/1
2 AEROSOL, METERED RESPIRATORY (INHALATION) EVERY 6 HOURS PRN
Qty: 8.5 G | Refills: 6 | Status: SHIPPED | OUTPATIENT
Start: 2024-05-16

## 2024-05-16 RX ADMIN — IPRATROPIUM BROMIDE AND ALBUTEROL SULFATE 3 ML: 2.5; .5 SOLUTION RESPIRATORY (INHALATION) at 20:33

## 2024-05-16 ASSESSMENT — ENCOUNTER SYMPTOMS
CONSTITUTIONAL NEGATIVE: 1
CARDIOVASCULAR NEGATIVE: 1
NEUROLOGICAL NEGATIVE: 1
SHORTNESS OF BREATH: 1
COUGH: 1
GASTROINTESTINAL NEGATIVE: 1

## 2024-08-05 ENCOUNTER — OFFICE VISIT (OUTPATIENT)
Dept: URGENT CARE | Facility: CLINIC | Age: 34
End: 2024-08-05
Payer: COMMERCIAL

## 2024-08-05 VITALS
WEIGHT: 218 LBS | BODY MASS INDEX: 42.8 KG/M2 | RESPIRATION RATE: 17 BRPM | TEMPERATURE: 97.6 F | HEIGHT: 60 IN | OXYGEN SATURATION: 97 % | HEART RATE: 77 BPM | SYSTOLIC BLOOD PRESSURE: 110 MMHG | DIASTOLIC BLOOD PRESSURE: 64 MMHG

## 2024-08-05 DIAGNOSIS — H60.391 OTHER INFECTIVE ACUTE OTITIS EXTERNA OF RIGHT EAR: ICD-10-CM

## 2024-08-05 PROCEDURE — 3074F SYST BP LT 130 MM HG: CPT | Performed by: PHYSICIAN ASSISTANT

## 2024-08-05 PROCEDURE — 99214 OFFICE O/P EST MOD 30 MIN: CPT | Performed by: PHYSICIAN ASSISTANT

## 2024-08-05 PROCEDURE — 3078F DIAST BP <80 MM HG: CPT | Performed by: PHYSICIAN ASSISTANT

## 2024-08-05 RX ORDER — CIPROFLOXACIN AND DEXAMETHASONE 3; 1 MG/ML; MG/ML
4 SUSPENSION/ DROPS AURICULAR (OTIC) 2 TIMES DAILY
Qty: 7.5 ML | Refills: 0 | Status: SHIPPED | OUTPATIENT
Start: 2024-08-05

## 2024-08-05 ASSESSMENT — ENCOUNTER SYMPTOMS
SINUS PAIN: 0
HEADACHES: 1
COUGH: 0
DIARRHEA: 0
SHORTNESS OF BREATH: 0
SORE THROAT: 0
ABDOMINAL PAIN: 0
RHINORRHEA: 0
CARDIOVASCULAR NEGATIVE: 1
CONSTIPATION: 0
VOMITING: 0
DIZZINESS: 0
WHEEZING: 0
CONSTITUTIONAL NEGATIVE: 1

## 2024-08-05 NOTE — PROGRESS NOTES
Subjective     Ann Salguero is a very pleasant 34 y.o. female who presents with Otalgia (Patient is here today for a qtip that was stuck in her right ear. Patient states that her daughters took it out but she has been having pain since. Patient states that she hears some ringing and was bleeding. Patient states that she has been using over the counter drops to help with ear pain )            Otalgia   There is pain in the right ear. This is a new problem. The current episode started in the past 7 days. The problem occurs constantly. The problem has been gradually worsening. There has been no fever. The fever has been present for Less than 1 day. The pain is at a severity of 6/10. The pain is moderate. Associated symptoms include ear discharge and headaches. Pertinent negatives include no abdominal pain, coughing, diarrhea, hearing loss, rhinorrhea, sore throat or vomiting. She has tried ear drops for the symptoms. The treatment provided no relief.         PMH:  has a past medical history of ASTHMA and Gestational diabetes mellitus (GDM), antepartum (7/3/2020).    She has no past medical history of Addisons disease ([facility]), Adrenal disorder ([facility]), Anemia, Anxiety, Arrhythmia, Arthritis, Blood transfusion without reported diagnosis, Cancer ([facility]), Cataract, CHF (congestive heart failure) ([facility]), Clotting disorder ([facility]), COPD (chronic obstructive pulmonary disease) ([facility]), Cushings syndrome ([facility]), Depression, Diabetic neuropathy ([facility]), GERD (gastroesophageal reflux disease), Glaucoma, Goiter, Head ache, Heart attack ([facility]), Heart murmur, HIV (human immunodeficiency virus infection) ([facility]), Hyperlipidemia, Hypertension, IBD (inflammatory bowel disease), Kidney disease, Meningitis, Migraine, Muscle disorder, Osteoporosis, Parathyroid disorder ([facility]), Pituitary disease ([facility]), Pulmonary emphysema ([facility]), Seizure ([facility]), Sickle cell disease ([facility]), Stroke ([facility]), Substance abuse ([facility]), Thyroid disease, Tuberculosis, or Urinary  tract infection.  MEDS:   Current Outpatient Medications:     ciprofloxacin/dexamethasone (CIPRODEX) 0.3-0.1 % Suspension, Administer 4 Drops into the right ear 2 times a day., Disp: 7.5 mL, Rfl: 0    fluticasone-salmeterol (ADVAIR) 250-50 MCG/ACT AEROSOL POWDER, BREATH ACTIVATED, Inhale 1 Puff every 12 hours., Disp: 60 Each, Rfl: 3    albuterol 108 (90 Base) MCG/ACT Aero Soln inhalation aerosol, Inhale 2 Puffs every 6 hours as needed for Shortness of Breath., Disp: 8.5 g, Rfl: 6  ALLERGIES:   Allergies   Allergen Reactions    Pcn [Penicillins] Rash     Pt states they get a rash      SURGHX:   Past Surgical History:   Procedure Laterality Date    PRIMARY C SECTION  12/29/2010    Performed by JEFFRY SALVADOR at [facility]     SOCHX:  reports that she has never smoked. She has never used smokeless tobacco. She reports that she does not drink alcohol and does not use drugs.  FH: family history includes Diabetes in her maternal grandfather, maternal grandmother, mother, and paternal grandfather; Heart Disease in her mother; Hyperlipidemia in her maternal grandfather and maternal grandmother; Hypertension in her father, maternal grandfather, maternal grandmother, and mother; Lung Disease in her mother; No Known Problems in her sister; Stroke in her mother.    Review of Systems   Constitutional: Negative.    HENT:  Positive for ear discharge and ear pain. Negative for congestion, hearing loss, rhinorrhea, sinus pain and sore throat.    Respiratory:  Negative for cough, shortness of breath and wheezing.    Cardiovascular: Negative.    Gastrointestinal:  Negative for abdominal pain, constipation, diarrhea and vomiting.   Neurological:  Positive for headaches. Negative for dizziness.       Medications, Allergies, and current problem list reviewed today in Epic           Objective     /64   Pulse 77   Temp 36.4 °C (97.6 °F) (Temporal)   Resp 17   Ht 1.524 m (5')   Wt 98.9 kg (218 lb)   SpO2 97%    BMI 42.58 kg/m²      Physical Exam  Vitals and nursing note reviewed.   Constitutional:       General: She is not in acute distress.     Appearance: Normal appearance. She is well-developed. She is not ill-appearing, toxic-appearing or diaphoretic.   HENT:      Head: Normocephalic and atraumatic.      Right Ear: Tympanic membrane, ear canal and external ear normal. Decreased hearing noted. Laceration, drainage, swelling and tenderness present. No mastoid tenderness. Tympanic membrane is not perforated, erythematous or bulging.      Left Ear: Hearing, tympanic membrane, ear canal and external ear normal. No mastoid tenderness. Tympanic membrane is not perforated, erythematous or bulging.      Nose: Nose normal. No congestion or rhinorrhea.      Mouth/Throat:      Mouth: Mucous membranes are moist.      Pharynx: No oropharyngeal exudate or posterior oropharyngeal erythema.   Eyes:      General:         Right eye: No discharge.         Left eye: No discharge.      Conjunctiva/sclera: Conjunctivae normal.   Cardiovascular:      Rate and Rhythm: Normal rate and regular rhythm.      Pulses: Normal pulses.      Heart sounds: Normal heart sounds.   Pulmonary:      Effort: Pulmonary effort is normal. No respiratory distress.      Breath sounds: Normal breath sounds. No wheezing, rhonchi or rales.   Musculoskeletal:      Cervical back: Normal range of motion and neck supple.      Right lower leg: No edema.      Left lower leg: No edema.   Lymphadenopathy:      Cervical: No cervical adenopathy.   Skin:     General: Skin is warm and dry.   Neurological:      General: No focal deficit present.      Mental Status: She is alert and oriented to person, place, and time. Mental status is at baseline.   Psychiatric:         Mood and Affect: Mood normal.         Behavior: Behavior normal.         Thought Content: Thought content normal.         Judgment: Judgment normal.                       Assessment & Plan     This is a very  pleasant 34-year-old female presents with right ear pain, swelling, decreased hearing and discharge.  She had a piece of a Q-tip stuck in her ear which her daughter was able to remove.  After removal she started having pain with some slight bleeding.  No fever, dizziness, URI symptoms or neck pain.  Vital signs normal.  Exam shows right ear canal erythema, edema, discharge.  Movement tenderness with manipulation of the auricle and tragus.  Small superficial abrasion.  TMs clear bilaterally without bulging or erythema.  No perforation, mastoid tenderness or foreign body. OTC meds and conservative measures as discussed      1. Other infective acute otitis externa of right ear  ciprofloxacin/dexamethasone (CIPRODEX) 0.3-0.1 % Suspension            I personally reviewed prior external notes and test results pertinent to today's visit. Return to clinic or go to ED if symptoms worsen or persist. Red flag symptoms and indications for ED discussed at length. Patient/Parent/Guardian voices understanding.  AVS with post-visit instructions printed and provided or given verbally.  Follow-up with your primary care provider in 3-5 days. All side effects and potential interactions of prescribed medication discussed including allergic response, GI upset, tendon injury, rash, sedation, OCP effectiveness, etc.    Please note that this dictation was created using voice recognition software. I have made every reasonable attempt to correct obvious errors, but I expect that there are errors of grammar and possibly content that I did not discover before finalizing the note.

## 2024-08-05 NOTE — LETTER
August 5, 2024         Patient: Ann Salguero   YOB: 1990   Date of Visit: 8/5/2024           To Whom it May Concern:    Ann Salguero was seen in my clinic on 8/5/2024. Please excuse any absences from work this week due to acute illness.        If you have any questions or concerns, please don't hesitate to call.        Sincerely,           Samuel Caldwell P.A.-C.  Electronically Signed

## 2024-09-29 ENCOUNTER — TELEPHONE (OUTPATIENT)
Dept: URGENT CARE | Facility: CLINIC | Age: 34
End: 2024-09-29

## 2024-09-29 ENCOUNTER — HOSPITAL ENCOUNTER (EMERGENCY)
Facility: MEDICAL CENTER | Age: 34
End: 2024-09-29
Attending: EMERGENCY MEDICINE
Payer: COMMERCIAL

## 2024-09-29 ENCOUNTER — OFFICE VISIT (OUTPATIENT)
Dept: URGENT CARE | Facility: CLINIC | Age: 34
End: 2024-09-29
Payer: COMMERCIAL

## 2024-09-29 ENCOUNTER — PHARMACY VISIT (OUTPATIENT)
Dept: PHARMACY | Facility: MEDICAL CENTER | Age: 34
End: 2024-09-29
Payer: COMMERCIAL

## 2024-09-29 VITALS
SYSTOLIC BLOOD PRESSURE: 140 MMHG | TEMPERATURE: 98.2 F | DIASTOLIC BLOOD PRESSURE: 86 MMHG | BODY MASS INDEX: 44.06 KG/M2 | RESPIRATION RATE: 16 BRPM | OXYGEN SATURATION: 99 % | WEIGHT: 224.43 LBS | HEART RATE: 88 BPM | HEIGHT: 60 IN

## 2024-09-29 VITALS
HEIGHT: 60 IN | OXYGEN SATURATION: 97 % | TEMPERATURE: 97.7 F | RESPIRATION RATE: 17 BRPM | WEIGHT: 223.6 LBS | DIASTOLIC BLOOD PRESSURE: 66 MMHG | SYSTOLIC BLOOD PRESSURE: 110 MMHG | BODY MASS INDEX: 43.9 KG/M2 | HEART RATE: 98 BPM

## 2024-09-29 DIAGNOSIS — K04.7 DENTAL ABSCESS: ICD-10-CM

## 2024-09-29 DIAGNOSIS — K04.7 DENTAL INFECTION: ICD-10-CM

## 2024-09-29 PROCEDURE — 303977 HCHG I & D

## 2024-09-29 PROCEDURE — 700101 HCHG RX REV CODE 250: Mod: UD | Performed by: EMERGENCY MEDICINE

## 2024-09-29 PROCEDURE — 99283 EMERGENCY DEPT VISIT LOW MDM: CPT

## 2024-09-29 PROCEDURE — RXMED WILLOW AMBULATORY MEDICATION CHARGE: Performed by: EMERGENCY MEDICINE

## 2024-09-29 RX ORDER — CLINDAMYCIN HYDROCHLORIDE 300 MG/1
300 CAPSULE ORAL 3 TIMES DAILY
Qty: 21 CAPSULE | Refills: 0 | Status: ACTIVE | OUTPATIENT
Start: 2024-09-29 | End: 2024-10-06

## 2024-09-29 RX ORDER — HYDROCODONE BITARTRATE AND ACETAMINOPHEN 5; 325 MG/1; MG/1
1 TABLET ORAL EVERY 6 HOURS PRN
Qty: 12 TABLET | Refills: 0 | Status: SHIPPED | OUTPATIENT
Start: 2024-09-29 | End: 2024-10-02

## 2024-09-29 RX ADMIN — LIDOCAINE HYDROCHLORIDE 5 ML: 10; .005 INJECTION, SOLUTION EPIDURAL; INFILTRATION; INTRACAUDAL; PERINEURAL at 21:00

## 2024-09-29 ASSESSMENT — ENCOUNTER SYMPTOMS
SORE THROAT: 0
FEVER: 1
SINUS PRESSURE: 1

## 2024-09-29 NOTE — LETTER
September 29, 2024         Patient: Ann Salguero   YOB: 1990   Date of Visit: 9/29/2024           To Whom it May Concern:    Ann Salguero was seen in my clinic on 9/29/2024.     If you have any questions or concerns, please don't hesitate to call.        Sincerely,           AMALIA Linares  Electronically Signed

## 2024-09-30 NOTE — ED PROVIDER NOTES
ED Provider Note    CHIEF COMPLAINT  Chief Complaint   Patient presents with    Dental Pain    Sent by MD       EXTERNAL RECORDS REVIEWED  Outpatient Notes from urgent care were patient was seen earlier today, does not appear any medications were prescribed or antibiotics    HPI/ROS  LIMITATION TO HISTORY   Select: : None  OUTSIDE HISTORIAN(S):  none    Ann Salguero is a 34 y.o. female who presents with left-sided dental pain.  Reports she has been by dentist that she is undergoing some respiratory nation type work primarily on the right side.  However on the left she does have a broken molar and over the last 2 days she has noted some increased pain to that area.  She notes no drainage, she does have some swelling towards her cheek in that area as well.  No fevers or chills, no difficulty breathing or swallowing, no headaches, she does report some bilateral ear pain    PAST MEDICAL HISTORY   has a past medical history of ASTHMA and Gestational diabetes mellitus (GDM), antepartum (7/3/2020).    SURGICAL HISTORY   has a past surgical history that includes primary c section (12/29/2010).    FAMILY HISTORY  Family History   Problem Relation Age of Onset    Lung Disease Mother         asthma    Diabetes Mother     Hypertension Mother     Stroke Mother     Heart Disease Mother         MI    Hypertension Father     Diabetes Maternal Grandmother     Hypertension Maternal Grandmother     Hyperlipidemia Maternal Grandmother     Diabetes Maternal Grandfather     Hypertension Maternal Grandfather     Hyperlipidemia Maternal Grandfather     Diabetes Paternal Grandfather     No Known Problems Sister        SOCIAL HISTORY  Social History     Tobacco Use    Smoking status: Never    Smokeless tobacco: Never   Vaping Use    Vaping status: Never Used   Substance and Sexual Activity    Alcohol use: No    Drug use: No    Sexual activity: Yes     Partners: Male     Birth control/protection: I.U.D.     Comment: Mirena IUD on 9/14/20        CURRENT MEDICATIONS  Home Medications       Reviewed by Charity Weaver R.N. (Registered Nurse) on 09/29/24 at 2000  Med List Status: Partial     Medication Last Dose Status   albuterol 108 (90 Base) MCG/ACT Aero Soln inhalation aerosol  Active   ciprofloxacin/dexamethasone (CIPRODEX) 0.3-0.1 % Suspension  Active   fluticasone-salmeterol (ADVAIR) 250-50 MCG/ACT AEROSOL POWDER, BREATH ACTIVATED  Active                    ALLERGIES  Allergies   Allergen Reactions    Pcn [Penicillins] Rash     Pt states they get a rash        PHYSICAL EXAM  VITAL SIGNS: BP (!) 140/86   Pulse 88   Temp 36.8 °C (98.2 °F)   Resp 16   Ht 1.524 m (5')   Wt 102 kg (224 lb 6.9 oz)   SpO2 99%   BMI 43.83 kg/m²      Pulse ox interpretation: I interpret this pulse ox as normal.  Constitutional: Alert in no apparent distress.  HENT: Normocephalic, Atraumatic, Bilateral external ears normal. Nose normal.  There is no trismus, over the left upper teeth, noted broken molar with some tenderness to percussion and dental decay to the molar and premolar as well, there is a 2-3 and millimeter appearance abscess as well, there is some tenderness over the left maxillary area although no erythema fluctuance or induration  otherwise sinuses and temples are nontender to palpation  Eyes: Pupils are equal and reactive. Conjunctiva normal, non-icteric.   Heart: Regular rate and rythm, no murmurs.    Lungs: Clear to auscultation bilaterally.  Abd: soft, NTTP, no mass, no pulsatile mass, non-distended, no rebound or guarding  Skin: Warm, Dry, No erythema, No rash.   Neurologic: Alert, nerves are intact grossly non-focal.   Psychiatric: Affect normal, Judgment normal, Mood normal, Appears appropriate                 Radiologist interpretation:  No orders to display       COURSE & MEDICAL DECISION MAKING    ASSESSMENT, COURSE AND PLAN  Care Narrative: 8:24 PM  Patient is evaluated at the bedside and chart is reviewed.  At this point she does appear  to have an apical infection, will plan for antibiotics, clindamycin given her allergy, as well as medication for pain.  She does have a few millimeter apparent abscess will drain this as well.  Doubt large abscess/deep space with no swelling and no pronounced ttp,no facial cellulitis on exam and no systemic sx (f/c/n/v), no sinus ttp to suggest sinus thrombus or CN palsy and no other emergent condition identified.    INCISION AND DRAINAGE PROCEDURE NOTE:  Patient identification was confirmed and consent was obtained.  This procedure was performed by Dr. Alcaraz at 915.  Site: Left upper molar  Sterile procedures observed  Needle size: 27 g  Anesthetic used (type and amt): 2 cc 1% lidocaine with epinephrine  18-gauge needle used for aspiration drainage  Drainage: 1 cc purulent  Site anesthetized,  wound drained  Pt tolerated procedure well without complications. Instructions for care discussed verbally and pt provided with additional written instructions for homecare and f/u.              PROBLEMS MANAGED  # Dental abscess.  Patient with small dental abscess that was drained as above.  She has no fevers, no findings of aerodigestive compromise.  Additional differential was considered as above.  She will be treated with clindamycin given her allergy, Norco for pain, follow-up with her dentist      DISPOSITION AND DISCUSSIONS    Barriers to care at this time, including but not limited to:  none .     Decision tools and prescription drugs considered including, but not limited to: Medication modification prescriptions for clindamycin and norco .    I reviewed prescription monitoring program for patient's narcotic use before prescribing a scheduled drug.The patient will not drink alcohol nor drive with prescribed medications. The patient will return for new or worsening symptoms and is stable at the time of discharge.    The patient is referred to a primary physician for blood pressure management, diabetic screening, and  for all other preventative health concerns.    In prescribing controlled substances to this patient, I certify that I have obtained and reviewed the medical history of Ann Salguero. I have also made a good perla effort to obtain applicable records from other providers who have treated the patient and records did not demonstrate any increased risk of substance abuse that would prevent me from prescribing controlled substances.     I have conducted a physical exam and documented it. I have reviewed Ms. Salguero’s prescription history as maintained by the [place] Prescription Monitoring Program.     I have assessed the patient’s risk for abuse, dependency, and addiction using the validated Opioid Risk Tool available at https://www.mdcalc.com/suhtdt-isbp-dsvc-ort-narcotic-abuse.     Given the above, I believe the benefits of controlled substance therapy outweigh the risks. The reasons for prescribing controlled substances include non-narcotic, oral analgesic alternatives have been inadequate for pain control. Accordingly, I have discussed the risk and benefits, treatment plan, and alternative therapies with the patient.       DISPOSITION:  Patient will be discharged home in stable condition.    FOLLOW UP:  your dentist            OUTPATIENT MEDICATIONS:  New Prescriptions    CLINDAMYCIN (CLEOCIN) 300 MG CAP    Take 1 Capsule by mouth 3 times a day for 7 days.    HYDROCODONE-ACETAMINOPHEN (NORCO) 5-325 MG TAB PER TABLET    Take 1 Tablet by mouth every 6 hours as needed (pain) for up to 3 days.         FINAL DIAGNOSIS  1. Dental infection         Electronically signed by: Angel Alcaraz M.D., 9/29/2024 8:23 PM

## 2024-09-30 NOTE — ED NOTES
Patient left before d/c paper/instruction was given, attempted to call patient 2x no answer but left a voicemail. Called spouse, states wife is not home with him.

## 2024-09-30 NOTE — PROGRESS NOTES
Subjective:   Ann Salguero is a 34 y.o. female who presents for Tooth Ache (Pt has upper (L) tooth pain, left jaw pain, (R) ear pain x 3 days )      Dental Pain   This is a new problem. Episode onset: 3 days; The problem has been rapidly worsening. The pain is at a severity of 10/10. Associated symptoms include facial pain, a fever and sinus pressure. She has tried acetaminophen and NSAIDs for the symptoms. The treatment provided no relief.       Review of Systems   Constitutional:  Positive for fever. Negative for malaise/fatigue.   HENT:  Positive for ear pain and sinus pressure. Negative for sore throat.         Dental pain       Medications:    albuterol Aers  clindamycin Caps  fluticasone-salmeterol Aepb  HYDROcodone-acetaminophen Tabs  lidocaine-epinephrine    Allergies: Pcn [penicillins]    Problem List: Ann Salguero does not have any pertinent problems on file.    Surgical History:  Past Surgical History:   Procedure Laterality Date    PRIMARY C SECTION  12/29/2010    Performed by JEFFRY SALVADOR at [facility]       Past Social Hx: Ann Salguero  reports that she has never smoked. She has never used smokeless tobacco. She reports that she does not drink alcohol and does not use drugs.     Past Family Hx:  Ann Salguero family history includes Diabetes in her maternal grandfather, maternal grandmother, mother, and paternal grandfather; Heart Disease in her mother; Hyperlipidemia in her maternal grandfather and maternal grandmother; Hypertension in her father, maternal grandfather, maternal grandmother, and mother; Lung Disease in her mother; No Known Problems in her sister; Stroke in her mother.     Problem list, medications, and allergies reviewed by myself today in Epic.     Objective:     /66 (BP Location: Left arm, Patient Position: Sitting, BP Cuff Size: Large adult)   Pulse 98   Temp 36.5 °C (97.7 °F) (Temporal)   Resp 17   Ht 1.524 m (5')   Wt 101 kg (223 lb  9.6 oz)   SpO2 97%   BMI 43.67 kg/m²     Physical Exam  Constitutional:       Appearance: Normal appearance. She is not ill-appearing or toxic-appearing.   HENT:      Head: Normocephalic.      Right Ear: External ear normal.      Left Ear: External ear normal.      Nose:      Left Sinus: Maxillary sinus tenderness present.      Mouth/Throat:      Lips: Pink.      Dentition: Abnormal dentition. Dental tenderness and dental abscesses present. No dental caries.     Eyes:      General: Lids are normal.   Pulmonary:      Effort: Pulmonary effort is normal. No accessory muscle usage.   Musculoskeletal:      Cervical back: Full passive range of motion without pain.   Neurological:      Mental Status: She is alert and oriented to person, place, and time.   Psychiatric:         Mood and Affect: Mood normal.         Thought Content: Thought content normal.         Assessment/Plan:     Diagnosis and associated orders:     1. Dental abscess           Comments/MDM:     At this time, I feel the patient requires a higher level of care including closer monitoring, stat lab work and/or imaging. This has been discussed with the patient and they state agreement and understanding.  The patient is in no acute distress upon clinic departure and will go directly to ED without delay.              Please note that this dictation was created using voice recognition software. I have made a reasonable attempt to correct obvious errors, but I expect that there are errors of grammar and possibly content that I did not discover before finalizing the note.    This note was electronically signed by Jf EL.

## 2024-09-30 NOTE — TELEPHONE ENCOUNTER
Jocelin from ER called, stated that Dr. Alcaraz was wondering where the notes from pt's visit earlier today. Please assist, thank you.

## 2024-09-30 NOTE — ED NOTES
Taken patient from triage waiting room, ambulatory with steady gait, alert/ oriented x 4.Verified patient identification.  Assumed patient care.   Placed on patient room. .   Given the call light and instructed to call for any assistance needed/ or concerns.   Bed on lowest position, side rails up, breaks locked. Awaiting for ERP.

## 2024-09-30 NOTE — ED TRIAGE NOTES
Vitals:    09/29/24 1951   BP: (!) 142/90   Pulse: 91   Resp: 14   Temp: 36.2 °C (97.2 °F)   SpO2: 98%     Chief Complaint   Patient presents with    Dental Pain    Sent by MD     Pt reports a broken molar on the left top side of her mouth. Since her pain and swelling has progressed. She was sent by urgent care.     Pt is ambulatory to and from triage and is alert and oriented x 4.

## 2024-11-19 ENCOUNTER — OFFICE VISIT (OUTPATIENT)
Dept: URGENT CARE | Facility: CLINIC | Age: 34
End: 2024-11-19
Payer: COMMERCIAL

## 2024-11-19 VITALS
TEMPERATURE: 98 F | HEART RATE: 124 BPM | RESPIRATION RATE: 20 BRPM | DIASTOLIC BLOOD PRESSURE: 90 MMHG | OXYGEN SATURATION: 98 % | HEIGHT: 60 IN | WEIGHT: 226 LBS | BODY MASS INDEX: 44.37 KG/M2 | SYSTOLIC BLOOD PRESSURE: 146 MMHG

## 2024-11-19 DIAGNOSIS — J45.41 MODERATE PERSISTENT ASTHMA WITH EXACERBATION: ICD-10-CM

## 2024-11-19 DIAGNOSIS — R11.2 NAUSEA AND VOMITING, UNSPECIFIED VOMITING TYPE: ICD-10-CM

## 2024-11-19 DIAGNOSIS — J45.901 ASTHMA WITH ACUTE EXACERBATION, UNSPECIFIED ASTHMA SEVERITY, UNSPECIFIED WHETHER PERSISTENT: ICD-10-CM

## 2024-11-19 PROCEDURE — 3077F SYST BP >= 140 MM HG: CPT

## 2024-11-19 PROCEDURE — 3080F DIAST BP >= 90 MM HG: CPT

## 2024-11-19 PROCEDURE — 99214 OFFICE O/P EST MOD 30 MIN: CPT

## 2024-11-19 RX ORDER — ALBUTEROL SULFATE 90 UG/1
2 INHALANT RESPIRATORY (INHALATION) EVERY 6 HOURS PRN
Qty: 8.5 G | Refills: 0 | Status: SHIPPED | OUTPATIENT
Start: 2024-11-19

## 2024-11-19 RX ORDER — PREDNISONE 20 MG/1
40 TABLET ORAL DAILY
Qty: 10 TABLET | Refills: 0 | Status: SHIPPED | OUTPATIENT
Start: 2024-11-19 | End: 2024-11-24

## 2024-11-19 RX ORDER — ONDANSETRON 4 MG/1
4 TABLET, ORALLY DISINTEGRATING ORAL EVERY 6 HOURS PRN
Qty: 15 TABLET | Refills: 0 | Status: SHIPPED | OUTPATIENT
Start: 2024-11-19

## 2024-11-20 RX ORDER — FLUTICASONE PROPIONATE AND SALMETEROL 250; 50 UG/1; UG/1
1 POWDER RESPIRATORY (INHALATION) EVERY 12 HOURS
Qty: 60 EACH | Refills: 3 | Status: SHIPPED | OUTPATIENT
Start: 2024-11-20

## 2024-11-20 ASSESSMENT — ENCOUNTER SYMPTOMS
VOMITING: 1
COUGH: 1
CHILLS: 1
WHEEZING: 1
BLOOD IN STOOL: 0
NAUSEA: 1
SHORTNESS OF BREATH: 1
DIARRHEA: 1

## 2024-11-20 NOTE — PROGRESS NOTES
Verbal consent was acquired by the patient to use [place] ambient listening note generation during this visit   Subjective:   Ann Salguero is a 34 y.o. female who presents for Dizziness (X 5 days dizziness/ body aches/ abdominal pain/ ears have excess fluid/ runny nose/ diarrhea/ fats and slow heart rate)      HPI:  History of Present Illness  The patient is a 34-year-old female who presents for evaluation of multiple medical concerns.    She has been experiencing shortness of breath for the past 5 days, which has led to an increased use of her inhaler. She has a history of asthma and has been using Advair, but has run out of the medication for about a month now. She is currently using a rescue inhaler. She also reports wheezing and fatigue during physical activity, such as walking.    She has been experiencing stomach pain and diarrhea, with approximately 4 episodes of diarrhea at work. She reports no presence of blood in her stool. She has not consumed any food today and her last meal was yesterday. She also reports vomiting, with the most recent episode occurring an hour ago.    She reports feeling warm, but does not believe she has a fever. She has been coughing and sneezing frequently, and her eyes have been watering. She has been taking allergy medication, but it has not been effective. She also reports dizziness and body aches.       Review of Systems   Constitutional:  Positive for chills and malaise/fatigue.   Respiratory:  Positive for cough, shortness of breath and wheezing.    Gastrointestinal:  Positive for diarrhea, nausea and vomiting. Negative for blood in stool.       Medications:    Current Outpatient Medications on File Prior to Visit   Medication Sig Dispense Refill    fluticasone-salmeterol (ADVAIR) 250-50 MCG/ACT AEROSOL POWDER, BREATH ACTIVATED Inhale 1 Puff every 12 hours. (Patient not taking: Reported on 11/19/2024) 60 Each 3     No current facility-administered medications on file  prior to visit.        Allergies:   Pcn [penicillins]    Problem List:   Patient Active Problem List   Diagnosis    Obesity        Surgical History:  Past Surgical History:   Procedure Laterality Date    PRIMARY C SECTION  12/29/2010    Performed by JEFFRY SALVADOR at [facility]       Past Social Hx:   Social History     Tobacco Use    Smoking status: Never    Smokeless tobacco: Never   Vaping Use    Vaping status: Never Used   Substance Use Topics    Alcohol use: No    Drug use: No          Problem list, medications, and allergies reviewed by myself today in Epic.     Objective:     BP (!) 146/90 (BP Location: Left arm, Patient Position: Sitting, BP Cuff Size: Adult)   Pulse (!) 124   Temp 36.7 °C (98 °F)   Resp 20   Ht 1.524 m (5')   Wt 103 kg (226 lb)   SpO2 98%   BMI 44.14 kg/m²     Physical Exam  Vitals and nursing note reviewed.   Constitutional:       General: She is not in acute distress.     Appearance: Normal appearance. She is obese. She is not ill-appearing, toxic-appearing or diaphoretic.   HENT:      Head: Normocephalic and atraumatic.      Right Ear: Tympanic membrane, ear canal and external ear normal. There is no impacted cerumen.      Left Ear: Tympanic membrane, ear canal and external ear normal. There is no impacted cerumen.      Nose: Nose normal. No congestion or rhinorrhea.      Mouth/Throat:      Mouth: Mucous membranes are moist.      Pharynx: Oropharynx is clear. No oropharyngeal exudate or posterior oropharyngeal erythema.   Cardiovascular:      Rate and Rhythm: Regular rhythm. Tachycardia present.      Pulses: Normal pulses.      Heart sounds: Normal heart sounds. No murmur heard.     No friction rub. No gallop.   Pulmonary:      Effort: Pulmonary effort is normal. No respiratory distress.      Breath sounds: Normal breath sounds. No stridor. No wheezing, rhonchi or rales.   Chest:      Chest wall: No tenderness.   Musculoskeletal:      Cervical back: Neck supple. No  tenderness.   Lymphadenopathy:      Cervical: No cervical adenopathy.   Skin:     General: Skin is warm and dry.      Capillary Refill: Capillary refill takes less than 2 seconds.   Neurological:      General: No focal deficit present.      Mental Status: She is alert and oriented to person, place, and time. Mental status is at baseline.      Cranial Nerves: No cranial nerve deficit.      Motor: No weakness.      Gait: Gait normal.   Psychiatric:         Mood and Affect: Mood normal.         Behavior: Behavior normal.         Thought Content: Thought content normal.         Judgment: Judgment normal.         Assessment/Plan:     Diagnosis and associated orders:   1. Asthma with acute exacerbation, unspecified asthma severity, unspecified whether persistent  - predniSONE (DELTASONE) 20 MG Tab; Take 2 Tablets by mouth every day for 5 days.  Dispense: 10 Tablet; Refill: 0  - albuterol 108 (90 Base) MCG/ACT Aero Soln inhalation aerosol; Inhale 2 Puffs every 6 hours as needed for Shortness of Breath.  Dispense: 8.5 g; Refill: 0    2. Nausea and vomiting, unspecified vomiting type  - ondansetron (ZOFRAN ODT) 4 MG TABLET DISPERSIBLE; Take 1 Tablet by mouth every 6 hours as needed for Nausea/Vomiting for up to 15 doses.  Dispense: 15 Tablet; Refill: 0        Comments/MDM:   Pt is clinically stable at today's acute urgent care visit.  No acute distress noted. Appropriate for outpatient management at this time.     Assessment & Plan  1. Asthma exacerbation.  A 5-day course of oral steroids will be initiated. Refills for the rescue inhaler and Advair will be provided. She is advised to continue using her inhaler as needed.    2. Nausea and vomiting.  Zofran will be prescribed to manage the nausea. She is advised to maintain adequate hydration and adhere to a bland diet.    Patient is to return to  or go to ER for any new or worsening signs or symptoms, and follow with with PCP for recheck. Patient is agreeable with plan of  care and verbalizes good understanding.                  Discussed DDx, management options (risks,benefits, and alternatives to planned treatment), natural progression and supportive care.  Expressed understanding and the treatment plan was agreed upon. Questions were encouraged and answered   Return to urgent care prn if new or worsening sx or if there is no improvement in condition prn.    Educated in Red flags and indications to immediately call 911 or present to the Emergency Department.   Advised the patient to follow-up with the primary care physician for recheck, reevaluation, and consideration of further management.    I personally reviewed prior external notes and test results pertinent to today's visit.  I have independently reviewed and interpreted all diagnostics ordered during this urgent care acute visit.     Please note that this dictation was created using voice recognition software. I have made a reasonable attempt to correct obvious errors, but I expect that there are errors of grammar and possibly content that I did not discover before finalizing the note.    This note was electronically signed by NIKKO Hurley

## 2025-07-21 ENCOUNTER — APPOINTMENT (OUTPATIENT)
Dept: RADIOLOGY | Facility: IMAGING CENTER | Age: 35
End: 2025-07-21
Attending: PHYSICIAN ASSISTANT
Payer: COMMERCIAL

## 2025-07-21 ENCOUNTER — OFFICE VISIT (OUTPATIENT)
Dept: URGENT CARE | Facility: CLINIC | Age: 35
End: 2025-07-21
Payer: COMMERCIAL

## 2025-07-21 VITALS
HEIGHT: 60 IN | BODY MASS INDEX: 45.16 KG/M2 | SYSTOLIC BLOOD PRESSURE: 110 MMHG | DIASTOLIC BLOOD PRESSURE: 72 MMHG | HEART RATE: 89 BPM | TEMPERATURE: 97 F | RESPIRATION RATE: 20 BRPM | OXYGEN SATURATION: 94 % | WEIGHT: 230 LBS

## 2025-07-21 DIAGNOSIS — J45.41 MODERATE PERSISTENT ASTHMA WITH EXACERBATION: Primary | ICD-10-CM

## 2025-07-21 PROCEDURE — 71046 X-RAY EXAM CHEST 2 VIEWS: CPT | Mod: TC | Performed by: RADIOLOGY

## 2025-07-21 RX ORDER — FLUTICASONE PROPIONATE AND SALMETEROL 250; 50 UG/1; UG/1
1 POWDER RESPIRATORY (INHALATION) EVERY 12 HOURS
Qty: 60 EACH | Refills: 1 | Status: SHIPPED | OUTPATIENT
Start: 2025-07-21

## 2025-07-21 RX ORDER — PREDNISONE 10 MG/1
40 TABLET ORAL DAILY
Qty: 20 TABLET | Refills: 0 | Status: SHIPPED | OUTPATIENT
Start: 2025-07-21 | End: 2025-07-26

## 2025-07-21 RX ORDER — BENZONATATE 100 MG/1
100 CAPSULE ORAL 3 TIMES DAILY PRN
Qty: 30 CAPSULE | Refills: 0 | Status: SHIPPED | OUTPATIENT
Start: 2025-07-21 | End: 2025-07-31

## 2025-07-21 RX ORDER — ALBUTEROL SULFATE 90 UG/1
2 INHALANT RESPIRATORY (INHALATION) EVERY 6 HOURS PRN
Qty: 8.5 G | Refills: 0 | Status: SHIPPED | OUTPATIENT
Start: 2025-07-21

## 2025-07-21 RX ORDER — ALBUTEROL SULFATE 0.83 MG/ML
2.5 SOLUTION RESPIRATORY (INHALATION) ONCE
Status: COMPLETED | OUTPATIENT
Start: 2025-07-21 | End: 2025-07-21

## 2025-07-21 RX ORDER — DEXTROMETHORPHAN HYDROBROMIDE AND PROMETHAZINE HYDROCHLORIDE 15; 6.25 MG/5ML; MG/5ML
5 SYRUP ORAL EVERY 6 HOURS PRN
Qty: 118 ML | Refills: 0 | Status: SHIPPED | OUTPATIENT
Start: 2025-07-21 | End: 2025-07-28

## 2025-07-21 RX ADMIN — ALBUTEROL SULFATE 2.5 MG: 0.83 SOLUTION RESPIRATORY (INHALATION) at 16:24

## 2025-07-21 NOTE — PROGRESS NOTES
Subjective:   Ann Salguero is a 35 y.o. female who presents for Cough (Patient states Been having a cough I have asthma and I haven't been feeling good have to be using my inhaler more often than usual. Patient states that she has been having the cough for about 3 weeks. Patient states cough is getting dry and her upper back has been hurting. States she gets tired faster then usual. )      Got sick 3 weeks ago, feeling better and without fevers/malaise  But cough has remained and she has had ongoing asthma for the last 3 weeks  Gets really bad coughing episodes, especially at night  Using inhaler much more and not getting as much relief as normal  Is not having any fevers  Has been out of her maintenance inhaler for several weeks    ROS    Medications, Allergies, and current problem list reviewed today in Epic.     Objective:     /72   Pulse 89   Temp 36.1 °C (97 °F) (Temporal)   Resp 20   Ht 1.524 m (5')   Wt 104 kg (230 lb)   SpO2 94%     Physical Exam  Vitals reviewed.   Constitutional:       Appearance: Normal appearance.   HENT:      Head: Normocephalic and atraumatic.      Right Ear: Tympanic membrane, ear canal and external ear normal.      Left Ear: Tympanic membrane, ear canal and external ear normal.      Nose: Rhinorrhea present.      Mouth/Throat:      Mouth: Mucous membranes are moist.      Pharynx: Oropharynx is clear.   Eyes:      Conjunctiva/sclera: Conjunctivae normal.      Pupils: Pupils are equal, round, and reactive to light.   Cardiovascular:      Rate and Rhythm: Normal rate and regular rhythm.   Pulmonary:      Effort: Pulmonary effort is normal.      Comments: Speaking in full sentences, diminished breath sounds  Musculoskeletal:      Cervical back: Normal range of motion.   Skin:     General: Skin is warm and dry.      Capillary Refill: Capillary refill takes less than 2 seconds.   Neurological:      Mental Status: She is alert and oriented to person, place, and time.          RADIOLOGY RESULTS   DX-CHEST-2 VIEWS  Result Date: 7/21/2025 7/21/2025 3:43 PM HISTORY/REASON FOR EXAM:  Cough TECHNIQUE/EXAM DESCRIPTION AND NUMBER OF VIEWS: Two views of the chest. COMPARISON:  None available. FINDINGS: Cardiomediastinal contour is within normal limits. Linear opacity at LEFT lung base. Scalloping of RIGHT hemidiaphragm. No focal pulmonary consolidation. No pleural fluid collection or pneumothorax. No major bony abnormality is seen.     1.  Minimal LEFT lung base atelectasis. 2.  No pneumonia or pneumothorax.           Assessment/Plan:     Diagnosis and associated orders:     1. Moderate persistent asthma with exacerbation  DX-CHEST-2 VIEWS    albuterol (Proventil) 2.5mg/3ml nebulizer solution 2.5 mg    albuterol 108 (90 Base) MCG/ACT Aero Soln inhalation aerosol    predniSONE (DELTASONE) 10 MG Tab    fluticasone-salmeterol (ADVAIR) 250-50 MCG/ACT AEROSOL POWDER, BREATH ACTIVATED    albuterol 108 (90 Base) MCG/ACT Aero Soln inhalation aerosol    benzonatate (TESSALON) 100 MG Cap    promethazine-dextromethorphan (PROMETHAZINE-DM) 6.25-15 MG/5ML syrup         Comments/MDM:     After the nebulizer treatment patient had subjective improvement and improved vesicular breath sounds and was able to take more deep breaths more comfortably.  This is consistent with her x-ray showing some mild atelectasis.  I sent a refill of her inhaler and maintenance inhaler to the pharmacy.  Will put her on a 5-day course of prednisone as well as providing some cough suppressant medications.  She was warned about sedation from the promethazine based cough medicine.  There is no evidence to suggest a bacterial process or infection or need for antibiotics.  Follow-up as needed.         Differential diagnosis, natural history, supportive care, and indications for immediate follow-up discussed.    Advised the patient to follow-up with the primary care physician for recheck, reevaluation, and consideration of further  management.    Please note that this dictation was created using voice recognition software. I have made a reasonable attempt to correct obvious errors, but I expect that there are errors of grammar and possibly content that I did not discover before finalizing the note.    This note was electronically signed by Jose Luis Davila PA-C